# Patient Record
Sex: MALE | Race: BLACK OR AFRICAN AMERICAN | NOT HISPANIC OR LATINO | Employment: OTHER | URBAN - METROPOLITAN AREA
[De-identification: names, ages, dates, MRNs, and addresses within clinical notes are randomized per-mention and may not be internally consistent; named-entity substitution may affect disease eponyms.]

---

## 2022-10-10 RX ORDER — SODIUM CHLORIDE 450 MG/100ML
INJECTION, SOLUTION INTRAVENOUS CONTINUOUS
Status: CANCELLED | OUTPATIENT
Start: 2022-10-10

## 2022-10-10 RX ORDER — SODIUM CHLORIDE 0.9 % (FLUSH) 0.9 %
10 SYRINGE (ML) INJECTION
Status: CANCELLED | OUTPATIENT
Start: 2022-10-10

## 2022-10-11 ENCOUNTER — HOSPITAL ENCOUNTER (OUTPATIENT)
Facility: HOSPITAL | Age: 62
Discharge: HOME | End: 2022-10-11
Attending: INTERNAL MEDICINE | Admitting: INTERNAL MEDICINE
Payer: MEDICAID

## 2022-10-11 VITALS
OXYGEN SATURATION: 97 % | BODY MASS INDEX: 28.93 KG/M2 | RESPIRATION RATE: 16 BRPM | WEIGHT: 213.63 LBS | TEMPERATURE: 98 F | HEART RATE: 68 BPM | HEIGHT: 72 IN | SYSTOLIC BLOOD PRESSURE: 135 MMHG | DIASTOLIC BLOOD PRESSURE: 77 MMHG

## 2022-10-11 DIAGNOSIS — I87.1 MAY-THURNER SYNDROME: Primary | ICD-10-CM

## 2022-10-11 DIAGNOSIS — I87.2 VENOUS INSUFFICIENCY: ICD-10-CM

## 2022-10-11 LAB — POCT GLUCOSE: 101 MG/DL (ref 70–110)

## 2022-10-11 PROCEDURE — C1876 STENT, NON-COA/NON-COV W/DEL: HCPCS | Performed by: INTERNAL MEDICINE

## 2022-10-11 PROCEDURE — 36005 INJECTION EXT VENOGRAPHY: CPT | Mod: 50 | Performed by: INTERNAL MEDICINE

## 2022-10-11 PROCEDURE — 63600175 PHARM REV CODE 636 W HCPCS: Performed by: INTERNAL MEDICINE

## 2022-10-11 PROCEDURE — 37253 INTRVASC US NONCORONARY ADDL: CPT | Performed by: INTERNAL MEDICINE

## 2022-10-11 PROCEDURE — 37252 INTRVASC US NONCORONARY 1ST: CPT | Performed by: INTERNAL MEDICINE

## 2022-10-11 PROCEDURE — C1769 GUIDE WIRE: HCPCS | Performed by: INTERNAL MEDICINE

## 2022-10-11 PROCEDURE — C1725 CATH, TRANSLUMIN NON-LASER: HCPCS | Performed by: INTERNAL MEDICINE

## 2022-10-11 PROCEDURE — 75822 VEIN X-RAY ARMS/LEGS: CPT | Mod: XU | Performed by: INTERNAL MEDICINE

## 2022-10-11 PROCEDURE — 99153 MOD SED SAME PHYS/QHP EA: CPT | Performed by: INTERNAL MEDICINE

## 2022-10-11 PROCEDURE — 37238 OPEN/PERQ PLACE STENT SAME: CPT | Mod: 50 | Performed by: INTERNAL MEDICINE

## 2022-10-11 PROCEDURE — 25500020 PHARM REV CODE 255: Performed by: INTERNAL MEDICINE

## 2022-10-11 PROCEDURE — 99152 MOD SED SAME PHYS/QHP 5/>YRS: CPT | Performed by: INTERNAL MEDICINE

## 2022-10-11 PROCEDURE — C1894 INTRO/SHEATH, NON-LASER: HCPCS | Performed by: INTERNAL MEDICINE

## 2022-10-11 PROCEDURE — 25000003 PHARM REV CODE 250: Performed by: INTERNAL MEDICINE

## 2022-10-11 PROCEDURE — C1753 CATH, INTRAVAS ULTRASOUND: HCPCS | Performed by: INTERNAL MEDICINE

## 2022-10-11 DEVICE — STENT AB9U18150090 ABRE V01
Type: IMPLANTABLE DEVICE | Site: ABDOMEN | Status: FUNCTIONAL
Brand: ABRE™

## 2022-10-11 DEVICE — STENT AB9U16120090 ABRE V01
Type: IMPLANTABLE DEVICE | Site: ABDOMEN | Status: FUNCTIONAL
Brand: ABRE™

## 2022-10-11 RX ORDER — ACETAMINOPHEN 325 MG/1
650 TABLET ORAL EVERY 4 HOURS PRN
Status: DISCONTINUED | OUTPATIENT
Start: 2022-10-11 | End: 2022-10-11 | Stop reason: HOSPADM

## 2022-10-11 RX ORDER — CLOPIDOGREL BISULFATE 75 MG/1
75 TABLET ORAL DAILY
Qty: 90 TABLET | Refills: 0 | Status: ON HOLD | OUTPATIENT
Start: 2022-10-11 | End: 2023-05-25

## 2022-10-11 RX ORDER — LISINOPRIL 40 MG/1
40 TABLET ORAL EVERY MORNING
COMMUNITY
Start: 2022-09-26

## 2022-10-11 RX ORDER — LIDOCAINE HYDROCHLORIDE 10 MG/ML
INJECTION, SOLUTION EPIDURAL; INFILTRATION; INTRACAUDAL; PERINEURAL
Status: DISCONTINUED | OUTPATIENT
Start: 2022-10-11 | End: 2022-10-11 | Stop reason: HOSPADM

## 2022-10-11 RX ORDER — MAG HYDROX/ALUMINUM HYD/SIMETH 200-200-20
SUSPENSION, ORAL (FINAL DOSE FORM) ORAL
Status: DISCONTINUED | OUTPATIENT
Start: 2022-10-11 | End: 2022-10-11 | Stop reason: HOSPADM

## 2022-10-11 RX ORDER — DIAZEPAM 5 MG/1
5 TABLET ORAL
Status: DISCONTINUED | OUTPATIENT
Start: 2022-10-11 | End: 2022-10-11 | Stop reason: HOSPADM

## 2022-10-11 RX ORDER — METOPROLOL SUCCINATE 50 MG/1
50 TABLET, EXTENDED RELEASE ORAL 2 TIMES DAILY
COMMUNITY
Start: 2022-07-08

## 2022-10-11 RX ORDER — MIDAZOLAM HYDROCHLORIDE 1 MG/ML
INJECTION, SOLUTION INTRAMUSCULAR; INTRAVENOUS
Status: DISCONTINUED | OUTPATIENT
Start: 2022-10-11 | End: 2022-10-11 | Stop reason: HOSPADM

## 2022-10-11 RX ORDER — ONDANSETRON 4 MG/1
8 TABLET, ORALLY DISINTEGRATING ORAL EVERY 8 HOURS PRN
Status: DISCONTINUED | OUTPATIENT
Start: 2022-10-11 | End: 2022-10-11 | Stop reason: HOSPADM

## 2022-10-11 RX ORDER — ASPIRIN 325 MG
TABLET ORAL
Status: DISCONTINUED | OUTPATIENT
Start: 2022-10-11 | End: 2022-10-11 | Stop reason: HOSPADM

## 2022-10-11 RX ORDER — LINACLOTIDE 145 UG/1
145 CAPSULE, GELATIN COATED ORAL DAILY PRN
COMMUNITY
Start: 2022-10-03

## 2022-10-11 RX ORDER — GABAPENTIN 300 MG/1
300 CAPSULE ORAL 2 TIMES DAILY PRN
COMMUNITY
Start: 2022-05-06 | End: 2024-03-06

## 2022-10-11 RX ORDER — CLOPIDOGREL 300 MG/1
TABLET, FILM COATED ORAL
Status: DISCONTINUED | OUTPATIENT
Start: 2022-10-11 | End: 2022-10-11 | Stop reason: HOSPADM

## 2022-10-11 RX ORDER — SODIUM CHLORIDE 9 MG/ML
INJECTION, SOLUTION INTRAVENOUS CONTINUOUS
Status: DISCONTINUED | OUTPATIENT
Start: 2022-10-11 | End: 2022-10-11 | Stop reason: HOSPADM

## 2022-10-11 RX ORDER — RIVAROXABAN 20 MG/1
20 TABLET, FILM COATED ORAL EVERY MORNING
Status: ON HOLD | COMMUNITY
Start: 2022-10-03 | End: 2023-05-25 | Stop reason: SDUPTHER

## 2022-10-11 RX ORDER — DIAZEPAM 5 MG/1
10 TABLET ORAL
Status: DISPENSED | OUTPATIENT
Start: 2022-10-11

## 2022-10-11 RX ORDER — EZETIMIBE 10 MG/1
10 TABLET ORAL NIGHTLY
COMMUNITY
Start: 2022-10-04

## 2022-10-11 RX ORDER — DIPHENHYDRAMINE HCL 25 MG
50 CAPSULE ORAL
Status: DISPENSED | OUTPATIENT
Start: 2022-10-11

## 2022-10-11 RX ORDER — DICYCLOMINE HYDROCHLORIDE 20 MG/1
20 TABLET ORAL EVERY 8 HOURS PRN
COMMUNITY

## 2022-10-11 RX ORDER — FENTANYL CITRATE 50 UG/ML
INJECTION, SOLUTION INTRAMUSCULAR; INTRAVENOUS
Status: DISCONTINUED | OUTPATIENT
Start: 2022-10-11 | End: 2022-10-11 | Stop reason: HOSPADM

## 2022-10-11 RX ORDER — PANTOPRAZOLE SODIUM 40 MG/1
40 TABLET, DELAYED RELEASE ORAL EVERY MORNING
COMMUNITY
Start: 2022-06-06

## 2022-10-11 RX ORDER — AMLODIPINE BESYLATE 5 MG/1
5 TABLET ORAL NIGHTLY
COMMUNITY
Start: 2022-10-03

## 2022-10-11 RX ORDER — AMITRIPTYLINE HYDROCHLORIDE 25 MG/1
25 TABLET, FILM COATED ORAL NIGHTLY
COMMUNITY

## 2022-10-11 RX ORDER — HEPARIN SODIUM 1000 [USP'U]/ML
INJECTION, SOLUTION INTRAVENOUS; SUBCUTANEOUS
Status: DISCONTINUED | OUTPATIENT
Start: 2022-10-11 | End: 2022-10-11 | Stop reason: HOSPADM

## 2022-10-11 RX ADMIN — DIAZEPAM 5 MG: 5 TABLET ORAL at 08:10

## 2022-10-11 RX ADMIN — DIPHENHYDRAMINE HYDROCHLORIDE 50 MG: 25 CAPSULE ORAL at 08:10

## 2022-10-11 RX ADMIN — ONDANSETRON 8 MG: 4 TABLET, ORALLY DISINTEGRATING ORAL at 10:10

## 2022-10-11 NOTE — Clinical Note
The catheter was inserted into the  proximal R common iliac vein. IVUS performed then catheter removed. .

## 2022-10-11 NOTE — Clinical Note
The catheter was inserted into the  proximal L common iliac vein. IVUS performed then catheter removed. .

## 2022-10-11 NOTE — Clinical Note
A venogram was performed of the common iliac vein. The vessel was injected via power injection Bilat venogram  The venogram syringe was removed and the venogram is complete.

## 2022-10-11 NOTE — Clinical Note
dry, intact, no bleeding and no hematoma. Bilateral 10F venous sheaths left in place, covered with tegaderm.

## 2022-10-11 NOTE — Clinical Note
A percutaneous stick to the left femoral vein was performed. Ultrasound guidance was used to obtain access.

## 2022-10-11 NOTE — Clinical Note
A venogram was performed of the common iliac vein and external iliac vein. The vessel was injected via power injection Bilateral The venogram syringe was removed and the venogram is complete.

## 2022-10-27 NOTE — DISCHARGE SUMMARY
[facility] - Cath Lab Services  Discharge Note  Short Stay    Procedure(s) (LRB):  VENOGRAM, CATH LAB (N/A)  Intravascular Ultrasound, Non-Coronary      OUTCOME: Patient tolerated treatment/procedure well without complication and is now ready for discharge.    DISPOSITION: Home or Self Care    FINAL DIAGNOSIS:  <principal problem not specified>    FOLLOWUP: In clinic    DISCHARGE INSTRUCTIONS:    Discharge Procedure Orders   Diet Cardiac     Diet Cardiac     Call MD for:  temperature >100.4     Call MD for:  persistent nausea and vomiting     Call MD for:  redness, tenderness, or signs of infection (pain, swelling, redness, odor or green/yellow discharge around incision site)     Remove dressing in 24 hours     Other restrictions (specify):   Order Comments: No squatting, or heavy lifting for 1 week        TIME SPENT ON DISCHARGE: 31 minutes

## 2022-11-23 ENCOUNTER — HOSPITAL ENCOUNTER (OUTPATIENT)
Dept: RADIOLOGY | Facility: HOSPITAL | Age: 62
Discharge: HOME | End: 2022-11-23
Attending: FAMILY MEDICINE
Payer: MEDICAID

## 2022-11-23 DIAGNOSIS — M54.50 LOWER BACK PAIN: ICD-10-CM

## 2022-11-23 PROCEDURE — 72110 X-RAY EXAM L-2 SPINE 4/>VWS: CPT | Mod: TC

## 2022-11-23 PROCEDURE — 72074 X-RAY EXAM THORAC SPINE4/>VW: CPT | Mod: TC

## 2022-12-12 ENCOUNTER — HOSPITAL ENCOUNTER (OUTPATIENT)
Dept: RADIOLOGY | Facility: HOSPITAL | Age: 62
Discharge: HOME | End: 2022-12-12
Attending: FAMILY MEDICINE
Payer: MEDICAID

## 2022-12-12 DIAGNOSIS — R05.9 COUGH: ICD-10-CM

## 2022-12-12 PROCEDURE — 71046 X-RAY EXAM CHEST 2 VIEWS: CPT | Mod: TC

## 2023-03-13 ENCOUNTER — HOSPITAL ENCOUNTER (EMERGENCY)
Facility: HOSPITAL | Age: 63
Discharge: HOME | End: 2023-03-13
Attending: INTERNAL MEDICINE
Payer: MEDICAID

## 2023-03-13 VITALS
SYSTOLIC BLOOD PRESSURE: 143 MMHG | DIASTOLIC BLOOD PRESSURE: 79 MMHG | RESPIRATION RATE: 19 BRPM | BODY MASS INDEX: 30.34 KG/M2 | HEIGHT: 72 IN | HEART RATE: 61 BPM | WEIGHT: 224 LBS | OXYGEN SATURATION: 98 % | TEMPERATURE: 98 F

## 2023-03-13 DIAGNOSIS — R10.9 RIGHT FLANK PAIN: Primary | ICD-10-CM

## 2023-03-13 DIAGNOSIS — M79.669 PAIN AND SWELLING OF LOWER LEG: ICD-10-CM

## 2023-03-13 DIAGNOSIS — M79.89 PAIN AND SWELLING OF LOWER LEG: ICD-10-CM

## 2023-03-13 LAB
ALBUMIN SERPL-MCNC: 3.9 G/DL (ref 3.4–4.8)
ALBUMIN/GLOB SERPL: 1 RATIO (ref 1.1–2)
ALP SERPL-CCNC: 65 UNIT/L (ref 40–150)
ALT SERPL-CCNC: 31 UNIT/L (ref 0–55)
APPEARANCE UR: CLEAR
AST SERPL-CCNC: 25 UNIT/L (ref 5–34)
BACTERIA #/AREA URNS AUTO: NORMAL /HPF
BASOPHILS # BLD AUTO: 0.03 X10(3)/MCL (ref 0–0.2)
BASOPHILS NFR BLD AUTO: 0.8 %
BILIRUB UR QL STRIP.AUTO: NEGATIVE MG/DL
BILIRUBIN DIRECT+TOT PNL SERPL-MCNC: 0.5 MG/DL
BNP BLD-MCNC: 39.5 PG/ML
BUN SERPL-MCNC: 14 MG/DL (ref 8.4–25.7)
CALCIUM SERPL-MCNC: 9.6 MG/DL (ref 8.8–10)
CHLORIDE SERPL-SCNC: 107 MMOL/L (ref 98–107)
CO2 SERPL-SCNC: 23 MMOL/L (ref 23–31)
COLOR UR AUTO: YELLOW
CREAT SERPL-MCNC: 1.36 MG/DL (ref 0.73–1.18)
EOSINOPHIL # BLD AUTO: 0.35 X10(3)/MCL (ref 0–0.9)
EOSINOPHIL NFR BLD AUTO: 8.9 %
ERYTHROCYTE [DISTWIDTH] IN BLOOD BY AUTOMATED COUNT: 13 % (ref 11.5–17)
GFR SERPLBLD CREATININE-BSD FMLA CKD-EPI: 59 MLS/MIN/1.73/M2
GLOBULIN SER-MCNC: 4 GM/DL (ref 2.4–3.5)
GLUCOSE SERPL-MCNC: 105 MG/DL (ref 82–115)
GLUCOSE UR QL STRIP.AUTO: NEGATIVE MG/DL
HCT VFR BLD AUTO: 49.9 % (ref 42–52)
HGB BLD-MCNC: 15.5 G/DL (ref 14–18)
IMM GRANULOCYTES # BLD AUTO: 0.02 X10(3)/MCL (ref 0–0.04)
IMM GRANULOCYTES NFR BLD AUTO: 0.5 %
KETONES UR QL STRIP.AUTO: NEGATIVE MG/DL
LEUKOCYTE ESTERASE UR QL STRIP.AUTO: NEGATIVE UNIT/L
LYMPHOCYTES # BLD AUTO: 1 X10(3)/MCL (ref 0.6–4.6)
LYMPHOCYTES NFR BLD AUTO: 25.3 %
MCH RBC QN AUTO: 30.1 PG
MCHC RBC AUTO-ENTMCNC: 31.1 G/DL (ref 33–36)
MCV RBC AUTO: 96.9 FL (ref 80–94)
MONOCYTES # BLD AUTO: 0.38 X10(3)/MCL (ref 0.1–1.3)
MONOCYTES NFR BLD AUTO: 9.6 %
NEUTROPHILS # BLD AUTO: 2.17 X10(3)/MCL (ref 2.1–9.2)
NEUTROPHILS NFR BLD AUTO: 54.9 %
NITRITE UR QL STRIP.AUTO: NEGATIVE
PH UR STRIP.AUTO: 5.5 [PH]
PLATELET # BLD AUTO: 282 X10(3)/MCL (ref 130–400)
PMV BLD AUTO: 9 FL (ref 7.4–10.4)
POTASSIUM SERPL-SCNC: 5.3 MMOL/L (ref 3.5–5.1)
PROT SERPL-MCNC: 7.9 GM/DL (ref 5.8–7.6)
PROT UR QL STRIP.AUTO: ABNORMAL MG/DL
RBC # BLD AUTO: 5.15 X10(6)/MCL (ref 4.7–6.1)
RBC #/AREA URNS AUTO: NORMAL /HPF
RBC UR QL AUTO: NEGATIVE UNIT/L
SODIUM SERPL-SCNC: 138 MMOL/L (ref 136–145)
SP GR UR STRIP.AUTO: 1.01
SQUAMOUS #/AREA URNS AUTO: NORMAL /HPF
TROPONIN I SERPL-MCNC: <0.01 NG/ML (ref 0–0.04)
UROBILINOGEN UR STRIP-ACNC: 0.2 MG/DL
WBC # SPEC AUTO: 4 X10(3)/MCL (ref 4.5–11.5)
WBC #/AREA URNS AUTO: NORMAL /HPF

## 2023-03-13 PROCEDURE — 93010 EKG 12-LEAD: ICD-10-PCS | Mod: ,,, | Performed by: INTERNAL MEDICINE

## 2023-03-13 PROCEDURE — 83880 ASSAY OF NATRIURETIC PEPTIDE: CPT | Performed by: PHYSICIAN ASSISTANT

## 2023-03-13 PROCEDURE — 85025 COMPLETE CBC W/AUTO DIFF WBC: CPT | Performed by: PHYSICIAN ASSISTANT

## 2023-03-13 PROCEDURE — 81001 URINALYSIS AUTO W/SCOPE: CPT | Performed by: INTERNAL MEDICINE

## 2023-03-13 PROCEDURE — 80053 COMPREHEN METABOLIC PANEL: CPT | Performed by: PHYSICIAN ASSISTANT

## 2023-03-13 PROCEDURE — 93005 ELECTROCARDIOGRAM TRACING: CPT

## 2023-03-13 PROCEDURE — 84484 ASSAY OF TROPONIN QUANT: CPT | Performed by: PHYSICIAN ASSISTANT

## 2023-03-13 PROCEDURE — 99284 EMERGENCY DEPT VISIT MOD MDM: CPT

## 2023-03-13 PROCEDURE — 93010 ELECTROCARDIOGRAM REPORT: CPT | Mod: ,,, | Performed by: INTERNAL MEDICINE

## 2023-03-13 NOTE — ED PROVIDER NOTES
Encounter Date: 3/13/2023       History     Chief Complaint   Patient presents with    Back Pain    Leg Swelling     C/o right side back pain with swelling to both legs for 1 month     63-year-old male presents to ED for evaluation of right flank pain intermittent over the last month.  Patient reports to bilateral leg pain and swelling.  States he has a aching sensation at night.  Patient currently being treated for blood clot in his right leg.  States that he has been having issues with poor circulation in his following with Cardiology.  Denies any shortness of breath or chest pain.    The history is provided by the patient. No  was used.   Review of patient's allergies indicates:  No Known Allergies  Past Medical History:   Diagnosis Date    Anxiety disorder, unspecified     COPD (chronic obstructive pulmonary disease)     DVT (deep venous thrombosis)     Right femoral vein    Hypertension      Past Surgical History:   Procedure Laterality Date    INTRAVASCULAR ULTRASOUND, NON-CORONARY  10/11/2022    Procedure: Intravascular Ultrasound, Non-Coronary;  Surgeon: Timbo Clark MD;  Location: [facility] CATH LAB;  Service: Cardiology;;     No family history on file.  Social History     Tobacco Use    Smoking status: Former     Types: Cigarettes     Quit date:      Years since quittin.2    Smokeless tobacco: Never     Review of Systems    Physical Exam     Initial Vitals [23 1004]   BP Pulse Resp Temp SpO2   (!) 155/89 (!) 52 17 97.7 °F (36.5 °C) 98 %      MAP       --         Physical Exam    Nursing note and vitals reviewed.  Constitutional: He appears well-developed. He is cooperative.   HENT:   Head: Normocephalic and atraumatic.   Right Ear: Tympanic membrane and external ear normal.   Left Ear: Tympanic membrane and external ear normal.   Mouth/Throat: Uvula is midline, oropharynx is clear and moist and mucous membranes are normal. No trismus in the jaw. No uvula swelling.   Eyes:  Conjunctivae are normal. Pupils are equal, round, and reactive to light.   Neck: Neck supple.   Normal range of motion.  Cardiovascular:  Normal rate, regular rhythm and normal heart sounds.           Pulmonary/Chest: Breath sounds normal. No respiratory distress. He has no wheezes. He has no rhonchi. He has no rales.   Abdominal: Abdomen is soft. Bowel sounds are normal. There is no abdominal tenderness. There is no rebound and no guarding.   Musculoskeletal:         General: Normal range of motion.      Cervical back: Normal range of motion and neck supple.      Left lower le+ Edema present.      Comments: DP pulses 2+.  Swelling noted to right lower extremity greater than left     Neurological: He is alert and oriented to person, place, and time. He has normal strength. No cranial nerve deficit or sensory deficit. GCS score is 15. GCS eye subscore is 4. GCS verbal subscore is 5. GCS motor subscore is 6.   Skin: Skin is warm and dry. Capillary refill takes less than 2 seconds.   Psychiatric: He has a normal mood and affect.       ED Course   Procedures  Labs Reviewed   URINALYSIS, REFLEX TO URINE CULTURE - Abnormal; Notable for the following components:       Result Value    Protein, UA Trace (*)     All other components within normal limits   COMPREHENSIVE METABOLIC PANEL - Abnormal; Notable for the following components:    Potassium Level 5.3 (*)     Creatinine 1.36 (*)     Protein Total 7.9 (*)     Globulin 4.0 (*)     Albumin/Globulin Ratio 1.0 (*)     All other components within normal limits   CBC WITH DIFFERENTIAL - Abnormal; Notable for the following components:    WBC 4.0 (*)     MCV 96.9 (*)     MCHC 31.1 (*)     All other components within normal limits   URINALYSIS, MICROSCOPIC - Normal   B-TYPE NATRIURETIC PEPTIDE - Normal   TROPONIN I - Normal   CBC W/ AUTO DIFFERENTIAL    Narrative:     The following orders were created for panel order CBC auto differential.  Procedure                                Abnormality         Status                     ---------                               -----------         ------                     CBC with Differential[[id number]]        Abnormal            Final result                 Please view results for these tests on the individual orders.     EKG Readings: (Independently Interpreted)   Initial Reading: No STEMI. Rhythm: Sinus Bradycardia. Heart Rate: 51. Ectopy: No Ectopy. Conduction: Normal. ST Segments: Normal ST Segments. T Waves: Normal. Clinical Impression: Normal Sinus Rhythm Other Impression: Sinus bradycardia at rate of 51 otherwise normal EKG     Imaging Results    None          Medications   sodium chloride 0.9% bolus 1,000 mL 1,000 mL (1,000 mLs Intravenous Not Given 3/13/23 9765)     Medical Decision Making:   Initial Assessment:   63-year-old male presents to ED for evaluation of right flank pain intermittent over the last month.  Patient reports to bilateral leg pain and swelling.  States he has a aching sensation at night.  Patient currently being treated for blood clot in his right leg.  States that he has been having issues with poor circulation in his following with Cardiology.  Denies any shortness of breath or chest pain.  Differential Diagnosis:   Kidney stone, flank pain, DVT, electrolyte abnormality  ED Management:  Patient afebrile and in no acute distress.  Patient complaining of right flank pain with swelling and pain to his bilateral legs.  Patient reports he has been having intermittent bilateral leg pain and swelling worse on right since being diagnosed with DVT 1 month ago.  Reports that he has been seen by Cardiology with workup for poor circulation in his legs.  DP pulses 2+.  No signs of infection.  Patient denies any nausea, vomiting, or trouble urinating.  Patient concerned about kidney failure.  Mild elevation of creatinine at baseline from previous labs.  Offered patient hydration, patient refused.  Patient is requesting that he be  discharged home and will follow-up at his scheduled appointment this week with Cardiology.  Offered patient imaging for flank pain, patient refused again stating he would like to be discharged.  Return ED precautions given.  Offered patient analgesics patient declined.                        Clinical Impression:   Final diagnoses:  [M79.669, M79.89] Pain and swelling of lower leg  [R10.9] Right flank pain (Primary)        ED Disposition Condition    Discharge Stable          ED Prescriptions    None       Follow-up Information       Follow up With Specialties Details Why Contact Info    Mildred Silverio MD Family Medicine Call in 1 day  [address]  [facility]  [facility] [address]  [phone]               TIRSO Mcgraw  03/13/23 5209

## 2023-04-28 DIAGNOSIS — N23 RENAL COLIC: Primary | ICD-10-CM

## 2023-05-02 ENCOUNTER — CLINICAL SUPPORT (OUTPATIENT)
Dept: RESPIRATORY THERAPY | Facility: HOSPITAL | Age: 63
End: 2023-05-02
Attending: INTERNAL MEDICINE
Payer: MEDICAID

## 2023-05-02 DIAGNOSIS — K22.70 BARRETT'S ESOPHAGUS: Primary | ICD-10-CM

## 2023-05-02 DIAGNOSIS — K22.70 BARRETT'S ESOPHAGUS: ICD-10-CM

## 2023-05-02 PROCEDURE — 93010 EKG 12-LEAD: ICD-10-PCS | Mod: ,,, | Performed by: INTERNAL MEDICINE

## 2023-05-02 PROCEDURE — 93010 ELECTROCARDIOGRAM REPORT: CPT | Mod: ,,, | Performed by: INTERNAL MEDICINE

## 2023-05-02 PROCEDURE — 93005 ELECTROCARDIOGRAM TRACING: CPT

## 2023-05-12 ENCOUNTER — HOSPITAL ENCOUNTER (OUTPATIENT)
Dept: RADIOLOGY | Facility: HOSPITAL | Age: 63
Discharge: HOME | End: 2023-05-12
Attending: FAMILY MEDICINE
Payer: MEDICAID

## 2023-05-12 DIAGNOSIS — N23 RENAL COLIC: ICD-10-CM

## 2023-05-12 PROCEDURE — 76770 US EXAM ABDO BACK WALL COMP: CPT | Mod: TC

## 2023-05-22 DIAGNOSIS — K22.70 BARRETT'S ESOPHAGUS: Primary | ICD-10-CM

## 2023-05-24 ENCOUNTER — ANESTHESIA EVENT (OUTPATIENT)
Dept: SURGERY | Facility: HOSPITAL | Age: 63
End: 2023-05-24
Payer: MEDICAID

## 2023-05-24 ENCOUNTER — CLINICAL SUPPORT (OUTPATIENT)
Dept: RESPIRATORY THERAPY | Facility: HOSPITAL | Age: 63
End: 2023-05-24
Attending: INTERNAL MEDICINE
Payer: MEDICAID

## 2023-05-24 DIAGNOSIS — K22.70 BARRETT'S ESOPHAGUS: ICD-10-CM

## 2023-05-24 PROCEDURE — 93005 ELECTROCARDIOGRAM TRACING: CPT

## 2023-05-24 NOTE — ANESTHESIA PREPROCEDURE EVALUATION
05/24/2023  Marko Heard is a 63 y.o., male.      Pre-op Assessment    I have reviewed the Patient Summary Reports.     I have reviewed the Nursing Notes. I have reviewed the NPO Status.   I have reviewed the Medications.     Review of Systems  Anesthesia Hx:  Denies Family Hx of Anesthesia complications.   Denies Personal Hx of Anesthesia complications.   Social:  Former Smoker, Alcohol Use    Hematology/Oncology:  Hematology Normal   Oncology Normal     EENT/Dental:EENT/Dental Normal   Cardiovascular:   Hypertension, well controlled ECG has been reviewed.    Pulmonary:   COPD, moderate    Renal/:  Renal/ Normal     Hepatic/GI:   GERD, well controlled    Musculoskeletal:  Musculoskeletal Normal    Neurological:  Neurology Normal    Endocrine:  Endocrine Normal    Dermatological:  Skin Normal    Psych:   Psychiatric History          Physical Exam  General: Cooperative, Alert and Oriented    Airway:  Mallampati: II   Mouth Opening: Normal  TM Distance: Normal  Tongue: Normal  Neck ROM: Normal ROM    Dental:  Intact        Anesthesia Plan  Type of Anesthesia, risks & benefits discussed:    Anesthesia Type: Gen Natural Airway  Intra-op Monitoring Plan: Standard ASA Monitors  Post Op Pain Control Plan:   (medical reason for not using multimodal pain management)  Induction:  IV  Informed Consent: Informed consent signed with the Patient and all parties understand the risks and agree with anesthesia plan.  All questions answered. Patient consented to blood products? Yes  ASA Score: 3    Ready For Surgery From Anesthesia Perspective.     .

## 2023-05-24 NOTE — DISCHARGE INSTRUCTIONS
INSTRUCTIONS  AFTER A COLONOSCOPY/EGD                                                                                    NO DRIVING X 24 HOURS. NOTIFY YOUR DOCTOR WITH                                                                                 ABDOMINAL PAIN UNRELIEVED BY  PASSING GAS,                                                                           FEVER WITHIN 24 HOURS, ARE LARGE AMOUNT OF BLEEDING.

## 2023-05-25 ENCOUNTER — ANESTHESIA (OUTPATIENT)
Dept: SURGERY | Facility: HOSPITAL | Age: 63
End: 2023-05-25
Payer: MEDICAID

## 2023-05-25 ENCOUNTER — HOSPITAL ENCOUNTER (OUTPATIENT)
Facility: HOSPITAL | Age: 63
Discharge: HOME | End: 2023-05-25
Attending: INTERNAL MEDICINE | Admitting: INTERNAL MEDICINE
Payer: MEDICAID

## 2023-05-25 VITALS
TEMPERATURE: 98 F | HEART RATE: 60 BPM | RESPIRATION RATE: 18 BRPM | SYSTOLIC BLOOD PRESSURE: 155 MMHG | OXYGEN SATURATION: 96 % | DIASTOLIC BLOOD PRESSURE: 86 MMHG | WEIGHT: 228 LBS | BODY MASS INDEX: 30.92 KG/M2

## 2023-05-25 DIAGNOSIS — K22.70 BARRETT'S ESOPHAGUS WITHOUT DYSPLASIA: ICD-10-CM

## 2023-05-25 DIAGNOSIS — K22.70 BARRETT ESOPHAGUS: ICD-10-CM

## 2023-05-25 LAB — POCT GLUCOSE: 80 MG/DL (ref 70–110)

## 2023-05-25 PROCEDURE — 43239 EGD BIOPSY SINGLE/MULTIPLE: CPT | Performed by: INTERNAL MEDICINE

## 2023-05-25 PROCEDURE — 25000003 PHARM REV CODE 250: Performed by: NURSE ANESTHETIST, CERTIFIED REGISTERED

## 2023-05-25 PROCEDURE — D9220A PRA ANESTHESIA: Mod: ,,, | Performed by: NURSE ANESTHETIST, CERTIFIED REGISTERED

## 2023-05-25 PROCEDURE — 63600175 PHARM REV CODE 636 W HCPCS: Performed by: NURSE ANESTHETIST, CERTIFIED REGISTERED

## 2023-05-25 PROCEDURE — 27201423 OPTIME MED/SURG SUP & DEVICES STERILE SUPPLY: Performed by: INTERNAL MEDICINE

## 2023-05-25 PROCEDURE — 88313 SPECIAL STAINS GROUP 2: CPT

## 2023-05-25 PROCEDURE — 63600175 PHARM REV CODE 636 W HCPCS: Performed by: ANESTHESIOLOGY

## 2023-05-25 PROCEDURE — 88312 SPECIAL STAINS GROUP 1: CPT

## 2023-05-25 PROCEDURE — 88305 TISSUE EXAM BY PATHOLOGIST: CPT | Performed by: INTERNAL MEDICINE

## 2023-05-25 PROCEDURE — 37000009 HC ANESTHESIA EA ADD 15 MINS: Performed by: INTERNAL MEDICINE

## 2023-05-25 PROCEDURE — 37000008 HC ANESTHESIA 1ST 15 MINUTES: Performed by: INTERNAL MEDICINE

## 2023-05-25 PROCEDURE — D9220A PRA ANESTHESIA: ICD-10-PCS | Mod: ,,, | Performed by: NURSE ANESTHETIST, CERTIFIED REGISTERED

## 2023-05-25 RX ORDER — METOPROLOL TARTRATE 1 MG/ML
INJECTION, SOLUTION INTRAVENOUS
Status: DISCONTINUED | OUTPATIENT
Start: 2023-05-25 | End: 2023-05-25

## 2023-05-25 RX ORDER — LIDOCAINE HYDROCHLORIDE 20 MG/ML
SOLUTION OROPHARYNGEAL
Status: DISCONTINUED | OUTPATIENT
Start: 2023-05-25 | End: 2023-05-25

## 2023-05-25 RX ORDER — SODIUM CHLORIDE, SODIUM LACTATE, POTASSIUM CHLORIDE, CALCIUM CHLORIDE 600; 310; 30; 20 MG/100ML; MG/100ML; MG/100ML; MG/100ML
INJECTION, SOLUTION INTRAVENOUS CONTINUOUS
Status: DISCONTINUED | OUTPATIENT
Start: 2023-05-25 | End: 2023-05-25 | Stop reason: HOSPADM

## 2023-05-25 RX ORDER — CLOPIDOGREL BISULFATE 75 MG/1
75 TABLET ORAL DAILY
Qty: 90 TABLET | Refills: 0
Start: 2023-05-27 | End: 2023-08-25

## 2023-05-25 RX ORDER — LIDOCAINE HYDROCHLORIDE 20 MG/ML
INJECTION INTRAVENOUS
Status: DISCONTINUED | OUTPATIENT
Start: 2023-05-25 | End: 2023-05-25

## 2023-05-25 RX ORDER — RIVAROXABAN 20 MG/1
20 TABLET, FILM COATED ORAL EVERY MORNING
Start: 2023-05-27

## 2023-05-25 RX ORDER — PROPOFOL 10 MG/ML
VIAL (ML) INTRAVENOUS
Status: DISCONTINUED | OUTPATIENT
Start: 2023-05-25 | End: 2023-05-25

## 2023-05-25 RX ADMIN — LIDOCAINE HYDROCHLORIDE 15 ML: 20 SOLUTION ORAL at 11:05

## 2023-05-25 RX ADMIN — METOPROLOL TARTRATE 2 MG: 1 INJECTION, SOLUTION INTRAVENOUS at 11:05

## 2023-05-25 RX ADMIN — PROPOFOL 50 MG: 10 INJECTION, EMULSION INTRAVENOUS at 11:05

## 2023-05-25 RX ADMIN — LIDOCAINE HYDROCHLORIDE 100 MG: 20 INJECTION, SOLUTION INTRAVENOUS at 11:05

## 2023-05-25 RX ADMIN — SODIUM CHLORIDE, POTASSIUM CHLORIDE, SODIUM LACTATE AND CALCIUM CHLORIDE: 600; 310; 30; 20 INJECTION, SOLUTION INTRAVENOUS at 09:05

## 2023-05-25 RX ADMIN — PROPOFOL 150 MG: 10 INJECTION, EMULSION INTRAVENOUS at 11:05

## 2023-05-25 NOTE — ANESTHESIA POSTPROCEDURE EVALUATION
Anesthesia Post Evaluation    Patient: Marko Heard    Procedure(s) Performed: Procedure(s) (LRB):  EGD (ESOPHAGOGASTRODUODENOSCOPY) (N/A)  EGD, WITH CLOSED BIOPSY (N/A)    Final Anesthesia Type: general      Patient location during evaluation: OPS  Patient participation: Yes- Able to Participate  Level of consciousness: awake  Post-procedure vital signs: reviewed and stable  Pain management: adequate  Airway patency: patent  DALLAS mitigation strategies: Multimodal analgesia  PONV status at discharge: No PONV  Anesthetic complications: no      Cardiovascular status: hemodynamically stable  Respiratory status: unassisted, room air and spontaneous ventilation  Hydration status: euvolemic  Follow-up not needed.          Vitals Value Taken Time   /90 05/25/23 0928   Temp 36.6 °C (97.9 °F) 05/25/23 0928   Pulse 60 05/25/23 0928   Resp 18 05/25/23 1150   SpO2 97 % 05/25/23 0928         No case tracking events are documented in the log.      Pain/Yoli Score: No data recorded

## 2023-05-25 NOTE — OP NOTE
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  The patient was given the opportunity to ask questions and then signed informed consent. Patient was positioned in the left lateral decubitus position, continuous monitoring was initiated, and supplemental oxygen was provided via nasal cannula.  Bite block was placed.  Adequate sedation was achieved with the above mentioned medications and then titrated during the entire procedure.  Under direct visualization the gastroscope was introduced through the oropharynx in to the esophagus.  The scope was then advanced in to the stomach and second portion of the duodenum.  Scope was then withdrawn and the mucosa was carefully examined.  The entire gastric mucosa was examined, including the fundus with retroflexion.  Air was evacuated from the stomach and the scope was withdrawn in to the esophagus.  The entire esophageal mucosa was examined.  The patient tolerated the procedure well and was able to be transferred to the recovery area in stable condition.    Findings:                 Esophagus:  Normal esophagus.  Good peristalsis.  No signs of recurrence of Cross's  esophagus.  No biopsies taken.  Z-line was normal                    Stomach:  The stomach showed generalized mild to moderate gastritis with some petechial lose around the antral area.  Jar A was sent off for H pylori analysis.  Jar B was sent off for pathology evaluation plan on also jar C for angularis which had some of these lesions as well.  (I have a suspicion H pylori may be playing a factor) to the patient taking NSAIDs? ).                 Duodenum:  Duodenum was free of any mass lesions or polyps.     Specimens:   Specimen (24h ago, onward)       Start     Ordered    05/25/23 1143  Specimen to Pathology  RELEASE UPON ORDERING        References:    Click here for ordering Quick Tip   Question:  Release to patient  Answer:  Immediate    05/25/23 1143                      Estimated Blood Loss (EBL): * No values recorded between 5/25/2023 11:31 AM and 5/25/2023 11:53 AM *     Complications: No     Diagnostic Impression:  Patient with antral and lower gastric gastritis with some unusual atypical features which we need to see if through regarding H pylori infection versus some other issues.  Consider eosinophilic gastritis as well.  Could be taking NSAIDs indiscriminately as well?       Recommendations: Discharge patient to home. Resume previous diet.  Continue PPI.  Follow my Dr. CAMP through G recommendations for reflux..    Disposition:  Surgical floor stable then okay to go home.     Attestation: I performed the procedure.        Follow Up:             Will follow-up in 2 weeks to discuss pathology report        Michele Willson III, MD  5/25/2023

## 2023-05-26 LAB — PSYCHE PATHOLOGY RESULT: NORMAL

## 2023-06-27 ENCOUNTER — HOSPITAL ENCOUNTER (EMERGENCY)
Facility: HOSPITAL | Age: 63
Discharge: HOME | End: 2023-06-27
Attending: EMERGENCY MEDICINE
Payer: MEDICAID

## 2023-06-27 VITALS
TEMPERATURE: 98 F | RESPIRATION RATE: 18 BRPM | BODY MASS INDEX: 30.88 KG/M2 | SYSTOLIC BLOOD PRESSURE: 127 MMHG | WEIGHT: 228 LBS | HEART RATE: 68 BPM | DIASTOLIC BLOOD PRESSURE: 74 MMHG | HEIGHT: 72 IN | OXYGEN SATURATION: 98 %

## 2023-06-27 DIAGNOSIS — R07.9 CHEST PAIN: ICD-10-CM

## 2023-06-27 DIAGNOSIS — R07.89 ATYPICAL CHEST PAIN: Primary | ICD-10-CM

## 2023-06-27 LAB
ALBUMIN SERPL-MCNC: 3.9 G/DL (ref 3.4–4.8)
ALBUMIN/GLOB SERPL: 1.1 RATIO (ref 1.1–2)
ALP SERPL-CCNC: 75 UNIT/L (ref 40–150)
ALT SERPL-CCNC: 36 UNIT/L (ref 0–55)
AST SERPL-CCNC: 26 UNIT/L (ref 5–34)
BASOPHILS # BLD AUTO: 0.05 X10(3)/MCL
BASOPHILS NFR BLD AUTO: 1.1 %
BILIRUBIN DIRECT+TOT PNL SERPL-MCNC: 0.7 MG/DL
BUN SERPL-MCNC: 18 MG/DL (ref 8.4–25.7)
CALCIUM SERPL-MCNC: 9.7 MG/DL (ref 8.8–10)
CHLORIDE SERPL-SCNC: 109 MMOL/L (ref 98–107)
CO2 SERPL-SCNC: 22 MMOL/L (ref 23–31)
CREAT SERPL-MCNC: 1.47 MG/DL (ref 0.73–1.18)
EOSINOPHIL # BLD AUTO: 0.49 X10(3)/MCL (ref 0–0.9)
EOSINOPHIL NFR BLD AUTO: 11.1 %
ERYTHROCYTE [DISTWIDTH] IN BLOOD BY AUTOMATED COUNT: 12.7 % (ref 11.5–17)
GFR SERPLBLD CREATININE-BSD FMLA CKD-EPI: 54 MLS/MIN/1.73/M2
GLOBULIN SER-MCNC: 3.4 GM/DL (ref 2.4–3.5)
GLUCOSE SERPL-MCNC: 113 MG/DL (ref 82–115)
HCT VFR BLD AUTO: 47.3 % (ref 42–52)
HGB BLD-MCNC: 15.8 G/DL (ref 14–18)
IMM GRANULOCYTES # BLD AUTO: 0.01 X10(3)/MCL (ref 0–0.04)
IMM GRANULOCYTES NFR BLD AUTO: 0.2 %
LIPASE SERPL-CCNC: 15 U/L
LYMPHOCYTES # BLD AUTO: 1.08 X10(3)/MCL (ref 0.6–4.6)
LYMPHOCYTES NFR BLD AUTO: 24.5 %
MCH RBC QN AUTO: 30.7 PG (ref 27–31)
MCHC RBC AUTO-ENTMCNC: 33.4 G/DL (ref 33–36)
MCV RBC AUTO: 92 FL (ref 80–94)
MONOCYTES # BLD AUTO: 0.41 X10(3)/MCL (ref 0.1–1.3)
MONOCYTES NFR BLD AUTO: 9.3 %
NEUTROPHILS # BLD AUTO: 2.36 X10(3)/MCL (ref 2.1–9.2)
NEUTROPHILS NFR BLD AUTO: 53.8 %
NRBC BLD AUTO-RTO: 0 %
PLATELET # BLD AUTO: 262 X10(3)/MCL (ref 130–400)
PMV BLD AUTO: 9.2 FL (ref 7.4–10.4)
POTASSIUM SERPL-SCNC: 4.5 MMOL/L (ref 3.5–5.1)
PROT SERPL-MCNC: 7.3 GM/DL (ref 5.8–7.6)
RBC # BLD AUTO: 5.14 X10(6)/MCL (ref 4.7–6.1)
SODIUM SERPL-SCNC: 140 MMOL/L (ref 136–145)
TROPONIN I SERPL-MCNC: <0.01 NG/ML (ref 0–0.04)
WBC # SPEC AUTO: 4.4 X10(3)/MCL (ref 4.5–11.5)

## 2023-06-27 PROCEDURE — 25000003 PHARM REV CODE 250: Performed by: EMERGENCY MEDICINE

## 2023-06-27 PROCEDURE — 85025 COMPLETE CBC W/AUTO DIFF WBC: CPT | Performed by: EMERGENCY MEDICINE

## 2023-06-27 PROCEDURE — 83690 ASSAY OF LIPASE: CPT | Performed by: EMERGENCY MEDICINE

## 2023-06-27 PROCEDURE — 84484 ASSAY OF TROPONIN QUANT: CPT | Performed by: EMERGENCY MEDICINE

## 2023-06-27 PROCEDURE — 80053 COMPREHEN METABOLIC PANEL: CPT | Performed by: EMERGENCY MEDICINE

## 2023-06-27 PROCEDURE — 99285 EMERGENCY DEPT VISIT HI MDM: CPT | Mod: 25

## 2023-06-27 RX ORDER — ALUMINUM HYDROXIDE, MAGNESIUM HYDROXIDE, AND SIMETHICONE 2400; 240; 2400 MG/30ML; MG/30ML; MG/30ML
30 SUSPENSION ORAL
Status: COMPLETED | OUTPATIENT
Start: 2023-06-27 | End: 2023-06-27

## 2023-06-27 RX ORDER — TRAMADOL HYDROCHLORIDE 50 MG/1
50 TABLET ORAL EVERY 6 HOURS PRN
Qty: 20 TABLET | Refills: 0 | Status: SHIPPED | OUTPATIENT
Start: 2023-06-27 | End: 2023-07-02

## 2023-06-27 RX ORDER — SUCRALFATE 1 G/1
1 TABLET ORAL
Status: COMPLETED | OUTPATIENT
Start: 2023-06-27 | End: 2023-06-27

## 2023-06-27 RX ADMIN — ALUMINUM HYDROXIDE, MAGNESIUM HYDROXIDE, AND DIMETHICONE 30 ML: 400; 400; 40 SUSPENSION ORAL at 09:06

## 2023-06-27 RX ADMIN — SUCRALFATE 1 G: 1 TABLET ORAL at 09:06

## 2023-06-27 NOTE — ED PROVIDER NOTES
"Encounter Date: 6/27/2023       History     Chief Complaint   Patient presents with    Chest Pain     Pt complaint of intermittent chest pain over the past week with occurnace of general bad "burning" for a couple of days     The history is provided by the patient.   Chest Pain  The current episode started several days ago. Duration of episode(s) is 1 week. Chest pain occurs intermittently. The chest pain is unchanged. The quality of the pain is described as burning. The pain radiates to the epigastrium. Primary symptoms include abdominal pain. Pertinent negatives for primary symptoms include no fever, no shortness of breath, no cough, no palpitations, no nausea and no vomiting.   The abdominal pain began more than 2 days ago. The abdominal pain is located in the epigastric region.   Pertinent negatives for associated symptoms include no weakness. He tried nothing for the symptoms. Risk factors include male gender.   His past medical history is significant for DVT, hypertension and PVD.   Review of patient's allergies indicates:  No Known Allergies  Past Medical History:   Diagnosis Date    Anxiety disorder, unspecified     Cross esophagus     COPD (chronic obstructive pulmonary disease)     DVT (deep venous thrombosis)     Right femoral vein    Frequent urination     GERD (gastroesophageal reflux disease)     High cholesterol     Hypertension     PVD (peripheral vascular disease)     Seasonal allergies      Past Surgical History:   Procedure Laterality Date    EGD, WITH CLOSED BIOPSY N/A 5/25/2023    Procedure: EGD, WITH CLOSED BIOPSY;  Surgeon: Michele Willson III, MD;  Location: [facility];  Service: Endoscopy;  Laterality: N/A;  A.) Bx of antrum for h-pylori  B.) Bx of antrum pathology analysis  C.) Bx of angularis    ESOPHAGOGASTRODUODENOSCOPY N/A 5/25/2023    Procedure: EGD (ESOPHAGOGASTRODUODENOSCOPY);  Surgeon: Michele Willson III, MD;  Location: [facility];  Service: Endoscopy;  Laterality: N/A;  " Moderate Gastritis    INTRAVASCULAR ULTRASOUND, NON-CORONARY  10/11/2022    Procedure: Intravascular Ultrasound, Non-Coronary;  Surgeon: Timbo Clark MD;  Location: [facility] CATH LAB;  Service: Cardiology;;     Family History   Problem Relation Age of Onset    Hypertension Mother     Diabetes Mother     Diabetes Father     Hypertension Father      Social History     Tobacco Use    Smoking status: Former     Types: Cigarettes     Quit date:      Years since quittin.4    Smokeless tobacco: Never   Substance Use Topics    Alcohol use: Yes     Comment: Occasionally     Review of Systems   Constitutional:  Negative for fever.   HENT:  Negative for sore throat.    Respiratory:  Negative for cough and shortness of breath.    Cardiovascular:  Positive for chest pain. Negative for palpitations.   Gastrointestinal:  Positive for abdominal pain. Negative for nausea and vomiting.   Genitourinary:  Negative for dysuria.   Musculoskeletal:  Negative for back pain.   Skin:  Negative for rash.   Neurological:  Negative for weakness.   Hematological:  Does not bruise/bleed easily.     Physical Exam     Initial Vitals [23 0843]   BP Pulse Resp Temp SpO2   (!) 148/96 67 18 98.2 °F (36.8 °C) 98 %      MAP       --         Physical Exam    Nursing note and vitals reviewed.  Constitutional: He appears well-developed and well-nourished.   HENT:   Head: Normocephalic and atraumatic.   Right Ear: External ear normal.   Left Ear: External ear normal.   Nose: Nose normal.   Eyes: Conjunctivae and EOM are normal. Pupils are equal, round, and reactive to light.   Neck: Neck supple.   Normal range of motion.  Cardiovascular:  Normal rate, regular rhythm, normal heart sounds and intact distal pulses.           Pulmonary/Chest: Breath sounds normal.   Abdominal: Abdomen is soft. Bowel sounds are normal. There is abdominal tenderness in the epigastric area. There is no rebound and no guarding.   Musculoskeletal:         General: Normal  range of motion.      Cervical back: Normal range of motion and neck supple.     Neurological: He is alert and oriented to person, place, and time. He has normal strength. GCS score is 15. GCS eye subscore is 4. GCS verbal subscore is 5. GCS motor subscore is 6.   Skin: Skin is warm and dry. Capillary refill takes less than 2 seconds.   Psychiatric: He has a normal mood and affect. His behavior is normal. Judgment and thought content normal.       ED Course   Procedures  Labs Reviewed   COMPREHENSIVE METABOLIC PANEL - Abnormal; Notable for the following components:       Result Value    Chloride 109 (*)     Carbon Dioxide 22 (*)     Creatinine 1.47 (*)     All other components within normal limits   CBC WITH DIFFERENTIAL - Abnormal; Notable for the following components:    WBC 4.40 (*)     All other components within normal limits   LIPASE - Normal   TROPONIN I - Normal   CBC W/ AUTO DIFFERENTIAL    Narrative:     The following orders were created for panel order CBC auto differential.  Procedure                               Abnormality         Status                     ---------                               -----------         ------                     CBC with Differential[[id number]]        Abnormal            Final result                 Please view results for these tests on the individual orders.   RAINBOW DRAW    Narrative:     The following orders were created for panel order [place] Draw.  Procedure                               Abnormality         Status                     ---------                               -----------         ------                     Light Blue Top Hold[[id number]]                              In process                 Light Green Top Hold[[id number]]                             In process                 Lavender Top Hold[[id number]]                                In process                   Please view results for these tests on the individual orders.   LIGHT BLUE TOP HOLD    LIGHT GREEN TOP HOLD   LAVENDER TOP HOLD   RAINBOW DRAW    Narrative:     The following orders were created for panel order [place] Draw.  Procedure                               Abnormality         Status                     ---------                               -----------         ------                     Gold Top Hold[[id number]]                                    In process                   Please view results for these tests on the individual orders.   GOLD TOP HOLD     EKG Readings: (Independently Interpreted)   Initial Reading: No STEMI. Rhythm: Normal Sinus Rhythm. Heart Rate: 66. Ectopy: No Ectopy. Conduction: Normal. ST Segments: Normal ST Segments. T Waves: Normal. Axis: Normal. Clinical Impression: Normal Sinus Rhythm     Imaging Results              X-Ray Chest AP Portable (Final result)  Result time 06/27/23 09:06:48      Final result by Tre Patel MD (06/27/23 09:06:48)                   Impression:      1. No evidence of lobar type consolidation or acute cardiac decompensation is appreciated.      Electronically signed by: Tre Patel MD  Date:    06/27/2023  Time:    09:06               Narrative:    EXAMINATION:  XR CHEST AP PORTABLE    CLINICAL HISTORY:  Chest pain.    COMPARISON:  Chest x-ray 12/12/2022    FINDINGS:  Frontal view of the chest was obtained.  The cardiac silhouette is within normal limits for size. The aorta is mildly tortuous.  Pulmonary hyperinflation is seen.  Minimal reticular opacities in the left lung base may reflect atelectasis or scarring.  No evidence of lobar type consolidation, visible pneumothorax, or pleural effusion is seen.  No acute displaced fracture or dislocation is present.                                       Medications   aluminum & magnesium hydroxide-simethicone 400-400-40 mg/5 mL suspension 30 mL (30 mLs Oral Given 6/27/23 0916)   sucralfate tablet 1 g (1 g Oral Given 6/27/23 0916)      Differential includes:  angina, GERD, MSK pain, pleurisy,  gastritis, pancreatitis, PUD.  Will obtain EKG, CXR, CBC, CMP, lipase, troponin.               Minimal relief with Mylanta/Carafate.  Reviewed labs, EKG, CXR findings with patient and reassured that this does not appear to be cardiac in nature.  He has an Rx for PPI but does not take regularly.  Encouraged to take daily for at least the next week, or so to see if his symptoms improve.  Will D/C with analgesic and encouraged him to call Dr. Gallagher's office to schedule early F/U visit.         Clinical Impression:   Final diagnoses:  [R07.9] Chest pain  [R07.89] Atypical chest pain (Primary)        ED Disposition Condition    Discharge Stable          ED Prescriptions       Medication Sig Dispense Start Date End Date Auth. Provider    traMADoL (ULTRAM) 50 mg tablet Take 1 tablet (50 mg total) by mouth every 6 (six) hours as needed for Pain. 20 tablet 6/27/2023 7/2/2023 Ashu Lopez MD          Follow-up Information       Follow up With Specialties Details Why Contact Info    Ayush Paulino MD Cardiology Schedule an appointment as soon as possible for a visit in 1 week  [address] [place] [address]  [address]  [facility] [address]  [phone]               Ashu Lopez MD  06/27/23 3460

## 2023-06-27 NOTE — ED TRIAGE NOTES
"Pt complaint of intermittent chest pain over the past week with occurnace of general bad "burning" for a couple of days  "

## 2023-09-06 ENCOUNTER — HOSPITAL ENCOUNTER (EMERGENCY)
Facility: HOSPITAL | Age: 63
Discharge: HOME | End: 2023-09-06
Attending: INTERNAL MEDICINE
Payer: MEDICAID

## 2023-09-06 VITALS
BODY MASS INDEX: 30.07 KG/M2 | RESPIRATION RATE: 15 BRPM | SYSTOLIC BLOOD PRESSURE: 163 MMHG | TEMPERATURE: 96 F | OXYGEN SATURATION: 98 % | HEIGHT: 72 IN | DIASTOLIC BLOOD PRESSURE: 93 MMHG | WEIGHT: 222 LBS | HEART RATE: 60 BPM

## 2023-09-06 DIAGNOSIS — N40.0 PROSTATISM: ICD-10-CM

## 2023-09-06 DIAGNOSIS — M54.6 CHRONIC RIGHT-SIDED THORACIC BACK PAIN: ICD-10-CM

## 2023-09-06 DIAGNOSIS — M54.14 RADICULAR PAIN OF THORACIC REGION: Primary | ICD-10-CM

## 2023-09-06 DIAGNOSIS — G89.29 CHRONIC RIGHT-SIDED THORACIC BACK PAIN: ICD-10-CM

## 2023-09-06 LAB
APPEARANCE UR: CLEAR
BILIRUB UR QL STRIP.AUTO: NEGATIVE
COLOR UR: YELLOW
GLUCOSE UR QL STRIP.AUTO: NEGATIVE
KETONES UR QL STRIP.AUTO: NEGATIVE
LEUKOCYTE ESTERASE UR QL STRIP.AUTO: NEGATIVE
NITRITE UR QL STRIP.AUTO: NEGATIVE
PH UR STRIP.AUTO: 5.5 [PH]
PROT UR QL STRIP.AUTO: NEGATIVE
RBC UR QL AUTO: NEGATIVE
SP GR UR STRIP.AUTO: 1.01 (ref 1–1.03)
UROBILINOGEN UR STRIP-ACNC: 0.2

## 2023-09-06 PROCEDURE — 99282 EMERGENCY DEPT VISIT SF MDM: CPT

## 2023-09-06 PROCEDURE — 81003 URINALYSIS AUTO W/O SCOPE: CPT | Performed by: INTERNAL MEDICINE

## 2023-09-06 NOTE — ED PROVIDER NOTES
09/06/2023         9:14 AM    Source of History:  History obtained from the patient.     Chief complaint:  From Nurse Triage:  Urinary Frequency (C/o burning sensation to R side of abdomen and frequent urination for the past few months. States his urine has had a smell for the past few weeks.)    HISTORY OF PRESENT ILLNES:  Marko Heard is a 63 y.o. male  has a past medical history of Anxiety disorder, unspecified, Cross esophagus, COPD (chronic obstructive pulmonary disease), DVT (deep venous thrombosis), Frequent urination, GERD (gastroesophageal reflux disease), High cholesterol, Hypertension, PVD (peripheral vascular disease), and Seasonal allergies. presenting with Urinary Frequency (C/o burning sensation to R side of abdomen and frequent urination for the past few months. States his urine has had a smell for the past few weeks.)      REVIEW OF SYSTEMS:   Constitutional symptoms:  No Fever. No Chills    Skin symptoms:  No Rash.    Eye symptoms:  No Visual disturbance reported.   ENMT symptoms:  No Sore throat,    Respiratory symptoms:  No Shortness of Breath, no Cough, no Wheezing.    Cardiovascular symptoms:  No Chest Pain, No Palpitations.   Gastrointestinal symptoms:  No Abdominal Pain, No Nausea, No Vomiting, No Diarrhea, No Constipation.  Right flank burning sensation for months  Genitourinary symptoms:  No Dysuria,  nocturia multiple times  Musculoskeletal symptoms:  No Back pain,    Neurologic symptoms:  No Headache, No Dizziness.    Psychiatric symptoms:  No Anxiety, No Depression, No Substance Abuse.              Additional review of systems information: Patient Denies Any Other Complaints.    All Other Systems Reviewed With Patient And Negative.    ALLEGIES:  Review of patient's allergies indicates:  No Known Allergies    MEDICINE LIST:  Current Outpatient Medications   Medication Instructions    amitriptyline (ELAVIL) 25 mg, Oral, Nightly    amLODIPine (NORVASC) 5 mg, Oral, Nightly    clopidogreL  (PLAVIX) 75 mg, Oral, Daily    dicyclomine (BENTYL) 20 mg, Oral, Every 8 hours PRN    ezetimibe (ZETIA) 10 mg, Oral, Nightly    gabapentin (NEURONTIN) 300 mg, Oral, 2 times daily PRN    LINZESS 145 mcg, Oral, Daily PRN    lisinopriL (PRINIVIL,ZESTRIL) 40 mg, Oral, Every morning    metoprolol succinate (TOPROL-XL) 50 mg, Oral, 2 times daily    pantoprazole (PROTONIX) 40 mg, Oral, Every morning    XARELTO 20 mg, Oral, Every morning, Stopped 23        PMH:  As per HPI and below:    Reviewed and updated in chart.    PAST MEDICAL HISTORY:  Past Medical History:   Diagnosis Date    Anxiety disorder, unspecified     Cross esophagus     COPD (chronic obstructive pulmonary disease)     DVT (deep venous thrombosis)     Right femoral vein    Frequent urination     GERD (gastroesophageal reflux disease)     High cholesterol     Hypertension     PVD (peripheral vascular disease)     Seasonal allergies         PAST SURGICAL HISTORY:  Past Surgical History:   Procedure Laterality Date    EGD, WITH CLOSED BIOPSY N/A 2023    Procedure: EGD, WITH CLOSED BIOPSY;  Surgeon: Michele Willson III, MD;  Location: [facility];  Service: Endoscopy;  Laterality: N/A;  A.) Bx of antrum for h-pylori  B.) Bx of antrum pathology analysis  C.) Bx of angularis    ESOPHAGOGASTRODUODENOSCOPY N/A 2023    Procedure: EGD (ESOPHAGOGASTRODUODENOSCOPY);  Surgeon: Michele Willson III, MD;  Location: [facility];  Service: Endoscopy;  Laterality: N/A;  Moderate Gastritis    INTRAVASCULAR ULTRASOUND, NON-CORONARY  10/11/2022    Procedure: Intravascular Ultrasound, Non-Coronary;  Surgeon: Timbo Clark MD;  Location: [facility] CATH LAB;  Service: Cardiology;;       SOCIAL HISTORY:  Social History     Tobacco Use    Smoking status: Former     Current packs/day: 0.00     Types: Cigarettes     Quit date:      Years since quittin.6    Smokeless tobacco: Never   Substance Use Topics    Alcohol use: Yes     Comment: Occasionally       FAMILY  HISTORY:  Family History   Problem Relation Age of Onset    Hypertension Mother     Diabetes Mother     Diabetes Father     Hypertension Father         PROBLEM LIST:  Patient Active Problem List   Diagnosis    Venous insufficiency    May-Thurner syndrome        PHYSICAL EXAM:      ED Triage Vitals [09/06/23 0840]   BP (!) 163/93   Pulse 60   Resp 15   Temp 96.4 °F (35.8 °C)   SpO2 98 %        Vital Signs: Reviewed As In Chart.  General:  Alert, No Cardiorespiratory Distress Noted.   Eye:  Extraocular Movements Are Intact.   ENT: Mucus membranes are moist.   Cardiovascular:  Regular Rate And Rhythm, No Murmur, No Pedal Edema.  1+ bilateral posterior tibial   Respiratory:  Respirations Nonlabored, No Respiratory Distress, Good Bilateral Air Entry, No Rales, No Rhonchi.    Gastrointestinal:  Soft, Non Distended, Non Tenderness, Normal Bowel Sounds.  No rash in the area where he has burning sensation for months now  Neurological:  Alert And Oriented To Person, Place, Time, And Situation, Normal Motor Observed, Normal Speech Observed.  Musculoskeletal:  No Gross Deformity Noted.     Psychiatric:  Cooperative.      ED WORKUP FOR MEDICAL DECISION MAKING:    ED ORDERS:  Orders Placed This Encounter   Procedures    Urinalysis, Reflex to Urine Culture       ED MEDICINES:  Medications - No data to display             ED LABS ORDERED AND REVIEWED:  Admission on 09/06/2023   Component Date Value Ref Range Status    Color, UA 09/06/2023 Yellow  Yellow, Light-Yellow, Dark Yellow, Carol, Straw Final    Appearance, UA 09/06/2023 Clear  Clear Final    Specific Gravity, UA 09/06/2023 1.010  1.005 - 1.030 Final    pH, UA 09/06/2023 5.5  5.0 - 8.5 Final    Protein, UA 09/06/2023 Negative  Negative Final    Glucose, UA 09/06/2023 Negative  Negative, Normal Final    Ketones, UA 09/06/2023 Negative  Negative Final    Blood, UA 09/06/2023 Negative  Negative Final    Bilirubin, UA 09/06/2023 Negative  Negative Final    Urobilinogen, UA  09/06/2023 0.2  0.2, 1.0, Normal Final    Nitrites, UA 09/06/2023 Negative  Negative Final    Leukocyte Esterase, UA 09/06/2023 Negative  Negative Final       RADIOLOGY STUDIES ORDERED AND REVIEWED:  Imaging Results    None         MEDICAL DECISION MAKING:      Reviewed Nurses Note. Reviewed Vital Signs.     Reviewed Pertinent old records, History and updated as necessary.    Vitals:    09/06/23 0840   BP: (!) 163/93   Pulse: 60   Resp: 15   Temp: 96.4 °F (35.8 °C)        Medical Decision Making  63 y.o. male  has a past medical history of Anxiety disorder, unspecified, Cross esophagus, COPD (chronic obstructive pulmonary disease), DVT (deep venous thrombosis), Frequent urination, GERD (gastroesophageal reflux disease), High cholesterol, Hypertension, PVD (peripheral vascular disease), and Seasonal allergies. presenting with Urinary Frequency (C/o burning sensation to R side of abdomen and frequent urination for the past few months. States his urine has had a smell for the past few weeks.)    Patient comes to the emergency room with complaint of right flank area burning sensation for the last month or so, and then he has been having to get up at night 4-5 times to urinate, he also feels that there is some smell in the urine and he feels that something wrong with his kidney.  He also has chronic back pain and he has peripheral vascular disease he says the pain in his back is going down the legs now, and he is concerned that the something going on with his blood supply in the legs.    Patient has history of peripheral vascular disease, he is has stents in both the legs, he had DVT in the right leg, he is on Xarelto and Plavix for that    Patient also is taking gabapentin and Elavil for his chronic back pain    He has Cross's esophagus and he is taking medicine for that.    He is also getting up 4-5 times at night, and he feels that is something wrong with his kidney.    I have explained to patient that all his  problems are chronic and essentially needs to be addressed by different specialists, and they can always talk to his family doctor about getting referrals to them.  Patient says he already has seen his family doctor for this and she feels maybe a pinched nerve.  I advised him that then he already knows what is going on with his back.  So I would recommend to continue taking the same medicines and I can not prescribe him some muscle relaxant but he does not want to take them.    I have advised him to talk to his the vascular Dr. to evaluate him for blood supply to the legs but he has pulses in both the legs and he does not have any acute changes in there for which he needs to be transferred today and he can always talk to his doctor.  Patient verbalized understanding    After talking to me about 30 minutes patient is satisfied and is going home to see his family doctor, Urology, Cardiology and his back doctor.    Problems Addressed:  Chronic right-sided thoracic back pain:     Details: Patient is already being treated for this already knows that he has disc problems in the back  Prostatism:     Details: Patient has a perfectly normal urine, and I have explained every single line in the urine to the patient and I have advised him that this appears to be a problem with prostatism and needs to go and see a urologist to check his prostate or his family doctor can check for that.  But as such he does not have urinary obstruction at this time so there is no need to do any intervention at this time but he will definitely benefit from urology evaluation.  Radicular pain of thoracic region:     Details: Burning pain in the right lower thoracic and upper flank has been there for months and months now and he is it is a problem with the blood supply and I have explained to him that the blood supply to the leg will not be affected by this, he does not know if he ever had shingles in that area, he does not have any rash at this  time, I have explained to him that this is more radicular pain and is already taking gabapentin and Elavil for this, I can not prescribe him some muscle relaxant to see if that helps, but he says he does not want to take anymore medicines because there is kidney disease in the family and he does not want to get his kidneys involved.  He has applied for disability though on the basis of all these problems which he is reporting to me.  He just wanted to get checked out in the emergency room.    Amount and/or Complexity of Data Reviewed  Labs: ordered. Decision-making details documented in ED Course.                        PROCEDURES PERFORMED IN ED:  Procedures    DIAGNOSTIC IMPRESSION:        ICD-10-CM ICD-9-CM   1. Radicular pain of thoracic region  M54.14 724.4   2. Prostatism  N40.0 600.90   3. Chronic right-sided thoracic back pain  M54.6 724.1    G89.29 338.29         ED Disposition Condition    Discharge Stable               Medication List        CONTINUE taking these medications      amitriptyline 25 MG tablet  Commonly known as: ELAVIL     amLODIPine 5 MG tablet  Commonly known as: NORVASC     clopidogreL 75 mg tablet  Commonly known as: PLAVIX  Take 1 tablet (75 mg total) by mouth once daily. for 90 doses     dicyclomine 20 mg tablet  Commonly known as: BENTYL     ezetimibe 10 mg tablet  Commonly known as: ZETIA     gabapentin 300 MG capsule  Commonly known as: NEURONTIN     LINZESS 145 mcg Cap capsule  Generic drug: linaCLOtide     lisinopriL 40 MG tablet  Commonly known as: PRINIVIL,ZESTRIL     metoprolol succinate 50 MG 24 hr tablet  Commonly known as: TOPROL-XL     pantoprazole 40 MG tablet  Commonly known as: PROTONIX     XARELTO 20 mg Tab  Generic drug: rivaroxaban  Take 1 tablet (20 mg total) by mouth every morning. Stopped 5/20/23                Follow-up Information       Mildred Silverio MD In 2 days.    Specialty: Family Medicine  Contact information:  [address]  [place]  [address]  [phone]               Call  Urology.                              ED Prescriptions    None       Follow-up Information       Follow up With Specialties Details Why Contact Info    Mildred Silverio MD Family Medicine In 2 days  [address]  [address]  [facility] [address] [phone]      Urology  Call                  Nicole Rothman MD  09/06/23 9326

## 2023-09-06 NOTE — DISCHARGE INSTRUCTIONS
Talk to your vascular doctor to re-evaluate you for peripheral vascular disease    Talk to your family doctor to either refer you to a urologist or you can go and see the urologist yourself for your repeated urination at nighttime needs to be checked for prostate        Take medicines as prescribed, see a urologist for follow-up, further workup, and treatment as needed.    Return to emergency room in case you develop fever, vomiting, with pain together    See a family doctor to refer to a urologist    Luigi Chavez MD  [address] [facility] [place]  [address]      [address] MD.  [address] [facility] [place]  [address]          Take medicines as prescribed    See your family doctor in one to 2 days for further evaluation, workup, and treatment as necessary    Avoid driving or operating machinery while taking medicines as some medicines might cause drowsiness and may cause problems. Also pain medicines have potential of being addictive  so use Pain meds specially Narcotics Sparingly.    The exam and treatment you received in Emergency Room was for an urgent problem and NOT INTENDED AS COMPLETE CARE. It is important that you FOLLOW UP with a doctor for ongoing care. If your symptoms become WORSE or you DO NOT IMPROVE and you are unable to reach your health care provider, you should RETURN to the emergency department. The Emergency Room doctor has provided a PRELIMINARY INTERPRETATION of all your STUDIES. A final interpretation may be done after you are discharged. IF A CHANGE in your diagnosis or treatment is needed WE WILL CONTACT YOU. It is critical that we have a CURRENT PHONE NUMBER FOR YOU.

## 2023-12-06 ENCOUNTER — LAB VISIT (OUTPATIENT)
Dept: LAB | Facility: HOSPITAL | Age: 63
End: 2023-12-06
Attending: FAMILY MEDICINE
Payer: MEDICAID

## 2023-12-06 DIAGNOSIS — I11.9 HYPERTENSIVE HEART DISEASE WITHOUT CONGESTIVE HEART FAILURE: Primary | ICD-10-CM

## 2023-12-06 DIAGNOSIS — E78.5 HYPERLIPIDEMIA, UNSPECIFIED HYPERLIPIDEMIA TYPE: ICD-10-CM

## 2023-12-06 DIAGNOSIS — R73.01 IMPAIRED FASTING GLUCOSE: ICD-10-CM

## 2023-12-06 LAB
ALBUMIN SERPL-MCNC: 3.9 G/DL (ref 3.4–4.8)
ALBUMIN/GLOB SERPL: 1 RATIO (ref 1.1–2)
ALP SERPL-CCNC: 75 UNIT/L (ref 40–150)
ALT SERPL-CCNC: 43 UNIT/L (ref 0–55)
AST SERPL-CCNC: 32 UNIT/L (ref 5–34)
BASOPHILS # BLD AUTO: 0.04 X10(3)/MCL
BASOPHILS NFR BLD AUTO: 0.8 %
BILIRUB SERPL-MCNC: 1.1 MG/DL
BUN SERPL-MCNC: 15 MG/DL (ref 8.4–25.7)
CALCIUM SERPL-MCNC: 9.8 MG/DL (ref 8.8–10)
CHLORIDE SERPL-SCNC: 106 MMOL/L (ref 98–107)
CHOLEST SERPL-MCNC: 198 MG/DL
CHOLEST/HDLC SERPL: 6 {RATIO} (ref 0–5)
CO2 SERPL-SCNC: 25 MMOL/L (ref 23–31)
CREAT SERPL-MCNC: 1.36 MG/DL (ref 0.73–1.18)
EOSINOPHIL # BLD AUTO: 0.28 X10(3)/MCL (ref 0–0.9)
EOSINOPHIL NFR BLD AUTO: 5.7 %
ERYTHROCYTE [DISTWIDTH] IN BLOOD BY AUTOMATED COUNT: 12.5 % (ref 11.5–17)
EST. AVERAGE GLUCOSE BLD GHB EST-MCNC: 119.8 MG/DL
GFR SERPLBLD CREATININE-BSD FMLA CKD-EPI: 59 MLS/MIN/1.73/M2
GLOBULIN SER-MCNC: 4 GM/DL (ref 2.4–3.5)
GLUCOSE SERPL-MCNC: 95 MG/DL (ref 82–115)
HBA1C MFR BLD: 5.8 %
HCT VFR BLD AUTO: 50.5 % (ref 42–52)
HDLC SERPL-MCNC: 32 MG/DL (ref 35–60)
HGB BLD-MCNC: 16.5 G/DL (ref 14–18)
IMM GRANULOCYTES # BLD AUTO: 0.01 X10(3)/MCL (ref 0–0.04)
IMM GRANULOCYTES NFR BLD AUTO: 0.2 %
LDLC SERPL CALC-MCNC: 140 MG/DL (ref 50–140)
LYMPHOCYTES # BLD AUTO: 1.18 X10(3)/MCL (ref 0.6–4.6)
LYMPHOCYTES NFR BLD AUTO: 24.2 %
MCH RBC QN AUTO: 30.6 PG (ref 27–31)
MCHC RBC AUTO-ENTMCNC: 32.7 G/DL (ref 33–36)
MCV RBC AUTO: 93.7 FL (ref 80–94)
MONOCYTES # BLD AUTO: 0.44 X10(3)/MCL (ref 0.1–1.3)
MONOCYTES NFR BLD AUTO: 9 %
NEUTROPHILS # BLD AUTO: 2.92 X10(3)/MCL (ref 2.1–9.2)
NEUTROPHILS NFR BLD AUTO: 60.1 %
PLATELET # BLD AUTO: 279 X10(3)/MCL (ref 130–400)
PMV BLD AUTO: 8.9 FL (ref 7.4–10.4)
POTASSIUM SERPL-SCNC: 5.2 MMOL/L (ref 3.5–5.1)
PROT SERPL-MCNC: 7.9 GM/DL (ref 5.8–7.6)
RBC # BLD AUTO: 5.39 X10(6)/MCL (ref 4.7–6.1)
SODIUM SERPL-SCNC: 139 MMOL/L (ref 136–145)
TRIGL SERPL-MCNC: 129 MG/DL (ref 34–140)
TSH SERPL-ACNC: 1.12 UIU/ML (ref 0.35–4.94)
VLDLC SERPL CALC-MCNC: 26 MG/DL
WBC # SPEC AUTO: 4.87 X10(3)/MCL (ref 4.5–11.5)

## 2023-12-06 PROCEDURE — 80061 LIPID PANEL: CPT

## 2023-12-06 PROCEDURE — 83036 HEMOGLOBIN GLYCOSYLATED A1C: CPT

## 2023-12-06 PROCEDURE — 85025 COMPLETE CBC W/AUTO DIFF WBC: CPT

## 2023-12-06 PROCEDURE — 36415 COLL VENOUS BLD VENIPUNCTURE: CPT

## 2023-12-06 PROCEDURE — 84443 ASSAY THYROID STIM HORMONE: CPT

## 2023-12-06 PROCEDURE — 80053 COMPREHEN METABOLIC PANEL: CPT

## 2023-12-08 ENCOUNTER — LAB VISIT (OUTPATIENT)
Dept: LAB | Facility: HOSPITAL | Age: 63
End: 2023-12-08
Attending: FAMILY MEDICINE
Payer: MEDICAID

## 2023-12-08 DIAGNOSIS — E78.5 HYPERLIPIDEMIA, UNSPECIFIED HYPERLIPIDEMIA TYPE: Primary | ICD-10-CM

## 2023-12-08 LAB
ALBUMIN SERPL-MCNC: 3.7 G/DL (ref 3.4–4.8)
ALBUMIN/GLOB SERPL: 1 RATIO (ref 1.1–2)
ALP SERPL-CCNC: 79 UNIT/L (ref 40–150)
ALT SERPL-CCNC: 41 UNIT/L (ref 0–55)
AMYLASE SERPL-CCNC: 35 UNIT/L (ref 25–125)
AST SERPL-CCNC: 26 UNIT/L (ref 5–34)
BASOPHILS # BLD AUTO: 0.03 X10(3)/MCL
BASOPHILS NFR BLD AUTO: 0.7 %
BILIRUB SERPL-MCNC: 1.4 MG/DL
BUN SERPL-MCNC: 13 MG/DL (ref 8.4–25.7)
CALCIUM SERPL-MCNC: 9.7 MG/DL (ref 8.8–10)
CHLORIDE SERPL-SCNC: 106 MMOL/L (ref 98–107)
CHOLEST SERPL-MCNC: 189 MG/DL
CHOLEST/HDLC SERPL: 6 {RATIO} (ref 0–5)
CO2 SERPL-SCNC: 27 MMOL/L (ref 23–31)
CREAT SERPL-MCNC: 1.26 MG/DL (ref 0.73–1.18)
EOSINOPHIL # BLD AUTO: 0.34 X10(3)/MCL (ref 0–0.9)
EOSINOPHIL NFR BLD AUTO: 8.3 %
ERYTHROCYTE [DISTWIDTH] IN BLOOD BY AUTOMATED COUNT: 12.5 % (ref 11.5–17)
GFR SERPLBLD CREATININE-BSD FMLA CKD-EPI: >60 MLS/MIN/1.73/M2
GLOBULIN SER-MCNC: 3.8 GM/DL (ref 2.4–3.5)
GLUCOSE SERPL-MCNC: 101 MG/DL (ref 82–115)
HCT VFR BLD AUTO: 49.3 % (ref 42–52)
HDLC SERPL-MCNC: 33 MG/DL (ref 35–60)
HGB BLD-MCNC: 16 G/DL (ref 14–18)
IMM GRANULOCYTES # BLD AUTO: 0.01 X10(3)/MCL (ref 0–0.04)
IMM GRANULOCYTES NFR BLD AUTO: 0.2 %
LDLC SERPL CALC-MCNC: 131 MG/DL (ref 50–140)
LIPASE SERPL-CCNC: 13 U/L
LYMPHOCYTES # BLD AUTO: 1.31 X10(3)/MCL (ref 0.6–4.6)
LYMPHOCYTES NFR BLD AUTO: 31.9 %
MCH RBC QN AUTO: 30.4 PG (ref 27–31)
MCHC RBC AUTO-ENTMCNC: 32.5 G/DL (ref 33–36)
MCV RBC AUTO: 93.7 FL (ref 80–94)
MONOCYTES # BLD AUTO: 0.42 X10(3)/MCL (ref 0.1–1.3)
MONOCYTES NFR BLD AUTO: 10.2 %
NEUTROPHILS # BLD AUTO: 2 X10(3)/MCL (ref 2.1–9.2)
NEUTROPHILS NFR BLD AUTO: 48.7 %
PLATELET # BLD AUTO: 272 X10(3)/MCL (ref 130–400)
PMV BLD AUTO: 9.1 FL (ref 7.4–10.4)
POTASSIUM SERPL-SCNC: 4.6 MMOL/L (ref 3.5–5.1)
PROT SERPL-MCNC: 7.5 GM/DL (ref 5.8–7.6)
RBC # BLD AUTO: 5.26 X10(6)/MCL (ref 4.7–6.1)
SODIUM SERPL-SCNC: 139 MMOL/L (ref 136–145)
TRIGL SERPL-MCNC: 123 MG/DL (ref 34–140)
TSH SERPL-ACNC: 1.32 UIU/ML (ref 0.35–4.94)
VLDLC SERPL CALC-MCNC: 25 MG/DL
WBC # SPEC AUTO: 4.11 X10(3)/MCL (ref 4.5–11.5)

## 2023-12-08 PROCEDURE — 36415 COLL VENOUS BLD VENIPUNCTURE: CPT

## 2023-12-08 PROCEDURE — 80053 COMPREHEN METABOLIC PANEL: CPT

## 2023-12-08 PROCEDURE — 80061 LIPID PANEL: CPT

## 2023-12-08 PROCEDURE — 83690 ASSAY OF LIPASE: CPT

## 2023-12-08 PROCEDURE — 82150 ASSAY OF AMYLASE: CPT

## 2023-12-08 PROCEDURE — 85025 COMPLETE CBC W/AUTO DIFF WBC: CPT

## 2023-12-08 PROCEDURE — 84443 ASSAY THYROID STIM HORMONE: CPT

## 2024-01-03 DIAGNOSIS — N40.0 BPH WITHOUT OBSTRUCTION/LOWER URINARY TRACT SYMPTOMS: Primary | ICD-10-CM

## 2024-01-17 DIAGNOSIS — R10.84 ABDOMINAL PAIN, GENERALIZED: Primary | ICD-10-CM

## 2024-01-23 ENCOUNTER — HOSPITAL ENCOUNTER (OUTPATIENT)
Dept: RADIOLOGY | Facility: HOSPITAL | Age: 64
Discharge: HOME | End: 2024-01-23
Attending: FAMILY MEDICINE
Payer: MEDICAID

## 2024-01-23 DIAGNOSIS — R10.84 ABDOMINAL PAIN, GENERALIZED: ICD-10-CM

## 2024-01-23 PROCEDURE — 76700 US EXAM ABDOM COMPLETE: CPT | Mod: TC

## 2024-01-23 PROCEDURE — 76856 US EXAM PELVIC COMPLETE: CPT | Mod: TC

## 2024-03-06 ENCOUNTER — HOSPITAL ENCOUNTER (EMERGENCY)
Facility: HOSPITAL | Age: 64
Discharge: HOME | End: 2024-03-06
Attending: EMERGENCY MEDICINE
Payer: MEDICAID

## 2024-03-06 VITALS
TEMPERATURE: 98 F | WEIGHT: 224 LBS | DIASTOLIC BLOOD PRESSURE: 114 MMHG | SYSTOLIC BLOOD PRESSURE: 181 MMHG | BODY MASS INDEX: 43.98 KG/M2 | HEIGHT: 60 IN | HEART RATE: 72 BPM | RESPIRATION RATE: 18 BRPM | OXYGEN SATURATION: 95 %

## 2024-03-06 DIAGNOSIS — R52 PAIN: ICD-10-CM

## 2024-03-06 DIAGNOSIS — M54.12 CERVICAL RADICULOPATHY: Primary | ICD-10-CM

## 2024-03-06 PROCEDURE — 96372 THER/PROPH/DIAG INJ SC/IM: CPT | Performed by: EMERGENCY MEDICINE

## 2024-03-06 PROCEDURE — 99284 EMERGENCY DEPT VISIT MOD MDM: CPT | Mod: 25

## 2024-03-06 PROCEDURE — 63600175 PHARM REV CODE 636 W HCPCS: Performed by: EMERGENCY MEDICINE

## 2024-03-06 RX ORDER — TRAMADOL HYDROCHLORIDE 50 MG/1
50 TABLET ORAL EVERY 6 HOURS PRN
Qty: 20 TABLET | Refills: 0 | Status: SHIPPED | OUTPATIENT
Start: 2024-03-06 | End: 2024-03-11

## 2024-03-06 RX ORDER — DEXAMETHASONE SODIUM PHOSPHATE 4 MG/ML
8 INJECTION, SOLUTION INTRA-ARTICULAR; INTRALESIONAL; INTRAMUSCULAR; INTRAVENOUS; SOFT TISSUE
Status: COMPLETED | OUTPATIENT
Start: 2024-03-06 | End: 2024-03-06

## 2024-03-06 RX ORDER — CYCLOBENZAPRINE HCL 10 MG
10 TABLET ORAL 3 TIMES DAILY PRN
Qty: 15 TABLET | Refills: 0 | Status: SHIPPED | OUTPATIENT
Start: 2024-03-06 | End: 2024-03-11

## 2024-03-06 RX ORDER — GABAPENTIN 300 MG/1
300 CAPSULE ORAL 3 TIMES DAILY
Qty: 42 CAPSULE | Refills: 0 | Status: SHIPPED | OUTPATIENT
Start: 2024-03-06 | End: 2024-03-20

## 2024-03-06 RX ORDER — PREDNISONE 20 MG/1
60 TABLET ORAL DAILY
Qty: 15 TABLET | Refills: 0 | Status: SHIPPED | OUTPATIENT
Start: 2024-03-06 | End: 2024-03-11

## 2024-03-06 RX ADMIN — DEXAMETHASONE SODIUM PHOSPHATE 8 MG: 4 INJECTION, SOLUTION INTRA-ARTICULAR; INTRALESIONAL; INTRAMUSCULAR; INTRAVENOUS; SOFT TISSUE at 10:03

## 2024-03-06 NOTE — ED PROVIDER NOTES
Encounter Date: 3/6/2024       History     Chief Complaint   Patient presents with    Shoulder Pain     Pt c/o pain to rt shoulder and neck x 2 weeks, states has an old shoulder injury on rt side and pain returned 2 weeks ago.     The history is provided by the patient.   Shoulder Pain  This is a recurrent problem. The current episode started more than 1 week ago. The problem occurs constantly. The problem has not changed since onset.Pertinent negatives include no chest pain and no shortness of breath. Nothing aggravates the symptoms. Nothing relieves the symptoms.   Reports remote shoulder injury 20+ years ago.  States this pain is radiating into his neck.  No recent trauma.    Review of patient's allergies indicates:  No Known Allergies  Past Medical History:   Diagnosis Date    Anxiety disorder, unspecified     Cross esophagus     COPD (chronic obstructive pulmonary disease)     DVT (deep venous thrombosis)     Right femoral vein    Frequent urination     GERD (gastroesophageal reflux disease)     High cholesterol     Hypertension     PVD (peripheral vascular disease)     Seasonal allergies      Past Surgical History:   Procedure Laterality Date    EGD, WITH CLOSED BIOPSY N/A 5/25/2023    Procedure: EGD, WITH CLOSED BIOPSY;  Surgeon: Michele Willson III, MD;  Location: [facility];  Service: Endoscopy;  Laterality: N/A;  A.) Bx of antrum for h-pylori  B.) Bx of antrum pathology analysis  C.) Bx of angularis    ESOPHAGOGASTRODUODENOSCOPY N/A 5/25/2023    Procedure: EGD (ESOPHAGOGASTRODUODENOSCOPY);  Surgeon: Michele Willson III, MD;  Location: [facility];  Service: Endoscopy;  Laterality: N/A;  Moderate Gastritis    INTRAVASCULAR ULTRASOUND, NON-CORONARY  10/11/2022    Procedure: Intravascular Ultrasound, Non-Coronary;  Surgeon: Timbo Clark MD;  Location: [facility] CATH LAB;  Service: Cardiology;;     Family History   Problem Relation Age of Onset    Hypertension Mother     Diabetes Mother     Diabetes Father      Hypertension Father      Social History     Tobacco Use    Smoking status: Former     Current packs/day: 0.00     Types: Cigarettes     Quit date:      Years since quittin.1    Smokeless tobacco: Never   Substance Use Topics    Alcohol use: Yes     Comment: Occasionally     Review of Systems   Constitutional:  Negative for fever.   HENT:  Negative for sore throat.    Respiratory:  Negative for shortness of breath.    Cardiovascular:  Negative for chest pain.   Gastrointestinal:  Negative for nausea.   Genitourinary:  Negative for dysuria.   Musculoskeletal:  Negative for back pain.   Skin:  Negative for rash.   Neurological:  Negative for weakness.   Hematological:  Does not bruise/bleed easily.       Physical Exam     Initial Vitals [24 0932]   BP Pulse Resp Temp SpO2   (!) 181/114 72 18 97.7 °F (36.5 °C) 95 %      MAP       --         Physical Exam    Nursing note and vitals reviewed.  Constitutional: He appears well-developed and well-nourished.   HENT:   Head: Normocephalic and atraumatic.   Right Ear: External ear normal.   Left Ear: External ear normal.   Nose: Nose normal.   Eyes: Conjunctivae and EOM are normal. Pupils are equal, round, and reactive to light.   Neck: Neck supple.       Normal range of motion.  Cardiovascular:  Normal rate, regular rhythm, normal heart sounds and intact distal pulses.           Pulmonary/Chest: Breath sounds normal.   Abdominal: Abdomen is soft. Bowel sounds are normal.   Musculoskeletal:      Right shoulder: Decreased range of motion.        Arms:       Cervical back: Normal range of motion and neck supple.      Comments: Pain reproduced with internal rotation     Neurological: He is alert and oriented to person, place, and time. He has normal strength. GCS score is 15. GCS eye subscore is 4. GCS verbal subscore is 5. GCS motor subscore is 6.   Skin: Skin is warm and dry. Capillary refill takes less than 2 seconds.   Psychiatric: He has a normal mood and  affect. His behavior is normal. Judgment and thought content normal.         ED Course   Procedures  Labs Reviewed - No data to display       Imaging Results              X-Ray Cervical Spine AP And Lateral (Preliminary result)  Result time 03/06/24 11:05:39      Wet Read by Ashu Lopez MD (03/06/24 11:05:39, [facility] - Emergency Dept, Emergency Medicine)    Degenerative changes, NAF                                     X-Ray Shoulder Trauma Right (Preliminary result)  Result time 03/06/24 11:06:10      Wet Read by Ashu Lopez MD (03/06/24 11:06:10, [facility] - Emergency Dept, Emergency Medicine)    NAF                                     Medications   dexAMETHasone injection 8 mg (8 mg Intramuscular Given 3/6/24 1049)     Medical Decision Making  Amount and/or Complexity of Data Reviewed  Radiology: ordered and independent interpretation performed. Decision-making details documented in ED Course.    Risk  Prescription drug management.    Differential includes:  strain, sprain, OA, radiculopathy, spasm.  Will obtain x-rays of C-spine and right shoulder and give IM steroids (has h/o CKD, so will avoid NSAID's)                                  Clinical Impression:  Final diagnoses:  [R52] Pain  [M54.12] Cervical radiculopathy (Primary)          ED Disposition Condition    Discharge Stable          ED Prescriptions       Medication Sig Dispense Start Date End Date Auth. Provider    predniSONE (DELTASONE) 20 MG tablet Take 3 tablets (60 mg total) by mouth once daily. for 5 days 15 tablet 3/6/2024 3/11/2024 Ashu Lopez MD    gabapentin (NEURONTIN) 300 MG capsule Take 1 capsule (300 mg total) by mouth 3 (three) times daily. for 14 days 42 capsule 3/6/2024 3/20/2024 Ashu Lopez MD    cyclobenzaprine (FLEXERIL) 10 MG tablet Take 1 tablet (10 mg total) by mouth 3 (three) times daily as needed for Muscle spasms. 15 tablet 3/6/2024 3/11/2024  Ashu Lopez MD    traMADoL (ULTRAM) 50 mg tablet Take 1 tablet (50 mg total) by mouth every 6 (six) hours as needed for Pain. 20 tablet 3/6/2024 3/11/2024 Ashu Lopez MD          Follow-up Information       Follow up With Specialties Details Why Contact Info    Mildred Silverio MD Family Medicine Schedule an appointment as soon as possible for a visit   [address] [place] [id number]  [phone]               Ashu Lopez MD  03/06/24 4912

## 2024-03-14 ENCOUNTER — HOSPITAL ENCOUNTER (EMERGENCY)
Facility: HOSPITAL | Age: 64
Discharge: HOME | End: 2024-03-14
Attending: INTERNAL MEDICINE
Payer: MEDICAID

## 2024-03-14 VITALS
DIASTOLIC BLOOD PRESSURE: 75 MMHG | BODY MASS INDEX: 30.34 KG/M2 | RESPIRATION RATE: 18 BRPM | HEIGHT: 72 IN | SYSTOLIC BLOOD PRESSURE: 120 MMHG | WEIGHT: 224 LBS | TEMPERATURE: 97 F | HEART RATE: 66 BPM | OXYGEN SATURATION: 94 %

## 2024-03-14 DIAGNOSIS — F41.9 ANXIETY: Primary | ICD-10-CM

## 2024-03-14 DIAGNOSIS — R07.9 CHEST PAIN: ICD-10-CM

## 2024-03-14 LAB
ALBUMIN SERPL-MCNC: 3.5 G/DL (ref 3.4–4.8)
ALBUMIN/GLOB SERPL: 1 RATIO (ref 1.1–2)
ALP SERPL-CCNC: 68 UNIT/L (ref 40–150)
ALT SERPL-CCNC: 33 UNIT/L (ref 0–55)
AST SERPL-CCNC: 20 UNIT/L (ref 5–34)
BASOPHILS # BLD AUTO: 0.03 X10(3)/MCL
BASOPHILS NFR BLD AUTO: 0.4 %
BILIRUB SERPL-MCNC: 1.1 MG/DL
BNP BLD-MCNC: 20 PG/ML
BUN SERPL-MCNC: 27 MG/DL (ref 8.4–25.7)
CALCIUM SERPL-MCNC: 9.2 MG/DL (ref 8.8–10)
CHLORIDE SERPL-SCNC: 103 MMOL/L (ref 98–107)
CO2 SERPL-SCNC: 24 MMOL/L (ref 23–31)
CREAT SERPL-MCNC: 1.4 MG/DL (ref 0.73–1.18)
EOSINOPHIL # BLD AUTO: 0.24 X10(3)/MCL (ref 0–0.9)
EOSINOPHIL NFR BLD AUTO: 3.3 %
ERYTHROCYTE [DISTWIDTH] IN BLOOD BY AUTOMATED COUNT: 13.4 % (ref 11.5–17)
GFR SERPLBLD CREATININE-BSD FMLA CKD-EPI: 57 MLS/MIN/1.73/M2
GLOBULIN SER-MCNC: 3.5 GM/DL (ref 2.4–3.5)
GLUCOSE SERPL-MCNC: 134 MG/DL (ref 82–115)
HCT VFR BLD AUTO: 47.7 % (ref 42–52)
HGB BLD-MCNC: 15.3 G/DL (ref 14–18)
IMM GRANULOCYTES # BLD AUTO: 0.11 X10(3)/MCL (ref 0–0.04)
IMM GRANULOCYTES NFR BLD AUTO: 1.5 %
LYMPHOCYTES # BLD AUTO: 1.23 X10(3)/MCL (ref 0.6–4.6)
LYMPHOCYTES NFR BLD AUTO: 17.1 %
MCH RBC QN AUTO: 29.9 PG (ref 27–31)
MCHC RBC AUTO-ENTMCNC: 32.1 G/DL (ref 33–36)
MCV RBC AUTO: 93.2 FL (ref 80–94)
MONOCYTES # BLD AUTO: 0.76 X10(3)/MCL (ref 0.1–1.3)
MONOCYTES NFR BLD AUTO: 10.5 %
NEUTROPHILS # BLD AUTO: 4.84 X10(3)/MCL (ref 2.1–9.2)
NEUTROPHILS NFR BLD AUTO: 67.2 %
OHS QRS DURATION: 96 MS
OHS QTC CALCULATION: 433 MS
PLATELET # BLD AUTO: 289 X10(3)/MCL (ref 130–400)
PMV BLD AUTO: 9 FL (ref 7.4–10.4)
POCT GLUCOSE: 155 MG/DL (ref 70–110)
POTASSIUM SERPL-SCNC: 4.6 MMOL/L (ref 3.5–5.1)
PROT SERPL-MCNC: 7 GM/DL (ref 5.8–7.6)
RBC # BLD AUTO: 5.12 X10(6)/MCL (ref 4.7–6.1)
SODIUM SERPL-SCNC: 135 MMOL/L (ref 136–145)
TROPONIN I SERPL-MCNC: <0.01 NG/ML (ref 0–0.04)
WBC # SPEC AUTO: 7.21 X10(3)/MCL (ref 4.5–11.5)

## 2024-03-14 PROCEDURE — 93010 ELECTROCARDIOGRAM REPORT: CPT | Mod: ,,, | Performed by: INTERNAL MEDICINE

## 2024-03-14 PROCEDURE — 80053 COMPREHEN METABOLIC PANEL: CPT | Performed by: INTERNAL MEDICINE

## 2024-03-14 PROCEDURE — 83880 ASSAY OF NATRIURETIC PEPTIDE: CPT | Performed by: INTERNAL MEDICINE

## 2024-03-14 PROCEDURE — 85025 COMPLETE CBC W/AUTO DIFF WBC: CPT | Performed by: INTERNAL MEDICINE

## 2024-03-14 PROCEDURE — 82962 GLUCOSE BLOOD TEST: CPT

## 2024-03-14 PROCEDURE — 84484 ASSAY OF TROPONIN QUANT: CPT | Performed by: INTERNAL MEDICINE

## 2024-03-14 PROCEDURE — 25000003 PHARM REV CODE 250: Performed by: INTERNAL MEDICINE

## 2024-03-14 PROCEDURE — 93005 ELECTROCARDIOGRAM TRACING: CPT

## 2024-03-14 PROCEDURE — 99285 EMERGENCY DEPT VISIT HI MDM: CPT | Mod: 25

## 2024-03-14 RX ORDER — ALUMINUM HYDROXIDE, MAGNESIUM HYDROXIDE, AND SIMETHICONE 1200; 120; 1200 MG/30ML; MG/30ML; MG/30ML
30 SUSPENSION ORAL ONCE
Status: COMPLETED | OUTPATIENT
Start: 2024-03-14 | End: 2024-03-14

## 2024-03-14 RX ORDER — NAPROXEN SODIUM 220 MG/1
324 TABLET, FILM COATED ORAL
Status: COMPLETED | OUTPATIENT
Start: 2024-03-14 | End: 2024-03-14

## 2024-03-14 RX ORDER — ALPRAZOLAM 0.25 MG/1
0.25 TABLET ORAL
Status: COMPLETED | OUTPATIENT
Start: 2024-03-14 | End: 2024-03-14

## 2024-03-14 RX ADMIN — ALPRAZOLAM 0.25 MG: 0.25 TABLET ORAL at 12:03

## 2024-03-14 RX ADMIN — ALUMINUM HYDROXIDE, MAGNESIUM HYDROXIDE, AND DIMETHICONE 30 ML: 200; 20; 200 SUSPENSION ORAL at 11:03

## 2024-03-14 RX ADMIN — ASPIRIN 81 MG CHEWABLE TABLET 324 MG: 81 TABLET CHEWABLE at 11:03

## 2024-03-14 NOTE — ED PROVIDER NOTES
03/14/2024     11:32 AM    Source of History:  History obtained from the patient.     Chief complaint:  From Nurse Triage:  Chest Pain (Pt ambulated with steady gait to room 7 with c/o chest pain that started around 0130 this morning, pt now has mouth & bilateral arm numbness/tingling. Pt states that the arm tingling has been happening for the past week. Pt is worried that he is having a heart attack.)    HISTORY OF PRESENT ILLNES:  Marko Heard is a 64 y.o. male  has a past medical history of Anxiety disorder, unspecified, Cross esophagus, COPD (chronic obstructive pulmonary disease), DVT (deep venous thrombosis), Frequent urination, GERD (gastroesophageal reflux disease), High cholesterol, Hypertension, PVD (peripheral vascular disease), and Seasonal allergies. presenting with Chest Pain (Pt ambulated with steady gait to room 7 with c/o chest pain that started around 0130 this morning, pt now has mouth & bilateral arm numbness/tingling. Pt states that the arm tingling has been happening for the past week. Pt is worried that he is having a heart attack.)    REVIEW OF SYSTEMS:   Constitutional symptoms:  No Fever. No Chills    Skin symptoms:  No Rash.    Eye symptoms:  No Visual disturbance reported.   ENMT symptoms:  No Sore throat,    Respiratory symptoms:  No Shortness of Breath, no Cough, no Wheezing.    Cardiovascular symptoms:  Chest Pain left sided, No Palpitations.   Gastrointestinal symptoms:  Acid reflux bilateral No Abdominal Pain, No Nausea, No Vomiting, No Diarrhea, No Constipation.    Genitourinary symptoms:  No Dysuria,    Musculoskeletal symptoms:  No Back pain,    Neurologic symptoms:  No Headache, No Dizziness.    Psychiatric symptoms:  No Anxiety, No Depression, No Substance Abuse.              Additional review of systems information: Patient Denies Any Other Complaints.    All Other Systems Reviewed With Patient And Negative.    ALLEGIES:  Review of patient's allergies indicates:  No Known  Allergies    MEDICINE LIST:  Current Outpatient Medications   Medication Instructions    amitriptyline (ELAVIL) 25 mg, Oral, Nightly    amLODIPine (NORVASC) 5 mg, Oral, Nightly    clopidogreL (PLAVIX) 75 mg, Oral, Daily    dicyclomine (BENTYL) 20 mg, Oral, Every 8 hours PRN    ezetimibe (ZETIA) 10 mg, Oral, Nightly    gabapentin (NEURONTIN) 300 mg, Oral, 3 times daily    LINZESS 145 mcg, Oral, Daily PRN    lisinopriL (PRINIVIL,ZESTRIL) 40 mg, Oral, Every morning    metoprolol succinate (TOPROL-XL) 50 mg, Oral, 2 times daily    pantoprazole (PROTONIX) 40 mg, Oral, Every morning    XARELTO 20 mg, Oral, Every morning, Stopped 5/20/23     PMH:  As per HPI and below:    Reviewed and updated in chart.    PAST MEDICAL HISTORY:  Past Medical History:   Diagnosis Date    Anxiety disorder, unspecified     Cross esophagus     COPD (chronic obstructive pulmonary disease)     DVT (deep venous thrombosis)     Right femoral vein    Frequent urination     GERD (gastroesophageal reflux disease)     High cholesterol     Hypertension     PVD (peripheral vascular disease)     Seasonal allergies         PAST SURGICAL HISTORY:  Past Surgical History:   Procedure Laterality Date    EGD, WITH CLOSED BIOPSY N/A 5/25/2023    Procedure: EGD, WITH CLOSED BIOPSY;  Surgeon: Michele Willson III, MD;  Location: [facility];  Service: Endoscopy;  Laterality: N/A;  A.) Bx of antrum for h-pylori  B.) Bx of antrum pathology analysis  C.) Bx of angularis    ESOPHAGOGASTRODUODENOSCOPY N/A 5/25/2023    Procedure: EGD (ESOPHAGOGASTRODUODENOSCOPY);  Surgeon: Michele Willson III, MD;  Location: [facility];  Service: Endoscopy;  Laterality: N/A;  Moderate Gastritis    INTRAVASCULAR ULTRASOUND, NON-CORONARY  10/11/2022    Procedure: Intravascular Ultrasound, Non-Coronary;  Surgeon: Timbo Clark MD;  Location: [facility] CATH LAB;  Service: Cardiology;;       SOCIAL HISTORY:  Social History     Tobacco Use    Smoking status: Former     Current packs/day: 0.00      Types: Cigarettes     Quit date:      Years since quittin.2    Smokeless tobacco: Never   Substance Use Topics    Alcohol use: Yes     Comment: Occasionally    Drug use: Never       FAMILY HISTORY:  Family History   Problem Relation Age of Onset    Hypertension Mother     Diabetes Mother     Diabetes Father     Hypertension Father         PROBLEM LIST:  Patient Active Problem List   Diagnosis    Venous insufficiency    May-Thurner syndrome        PHYSICAL EXAM:      ED Triage Vitals   BP    Pulse    Resp    Temp    SpO2         Vital Signs: Reviewed As In Chart.  General:  Alert, No Cardiorespiratory Distress Noted.   Eye:  Extraocular Movements Are Intact.   ENT: Mucus membranes are moist.   Cardiovascular:  Regular Rate And Rhythm, No Murmur, No Pedal Edema.    Respiratory:  Respirations Nonlabored, No Respiratory Distress, Good Bilateral Air Entry, No Rales, No Rhonchi.    Gastrointestinal:  Soft, Non Distended, Non Tenderness, Normal Bowel Sounds.    Neurological:  Alert And Oriented To Person, Place, Time, And Situation, Normal Motor Observed, Normal Speech Observed.  Some tremor bilateral upper and lower extremities, walked in in a stable gait,  Musculoskeletal:  No Gross Deformity Noted.     Psychiatric:  Anxious, jittery, tremor, Cooperative.      ED WORKUP FOR MEDICAL DECISION MAKING:    ED ORDERS:  Orders Placed This Encounter   Procedures    X-ray Chest AP Portable    Comprehensive metabolic panel    CBC auto differential    Troponin I    Brain natriuretic peptide    CBC with Differential    Diet NPO    Vital signs    Cardiac Monitoring - Adult    Oxygen Continuous    Pulse Oximetry Continuous    EKG 12-LEAD    Insert saline lock       ED MEDICINES:  Medications   aspirin chewable tablet 324 mg (324 mg Oral Given 3/14/24 1140)   aluminum-magnesium hydroxide-simethicone 200-200-20 mg/5 mL suspension 30 mL (30 mLs Oral Given 3/14/24 1140)   ALPRAZolam tablet 0.25 mg (0.25 mg Oral Given 3/14/24  1210)          ECG Results              EKG 12-LEAD (Preliminary result)  Result time 03/14/24 12:15:14      Wet Read by Nicole Rothman MD (03/14/24 12:15:14, [facility] - Emergency Dept, Emergency Medicine)    EKG Initial Reading: Independently Interpreted by Nicole Rothman MD. independently as: No STEMI. Rhythm: Normal Sinus Rhythm, Rate 78. Ectopy: No Ectopy. Conduction: Normal. ST Segments:  Nonspecific ST changes. T Waves: Normal. Axis: Normal.                                     ED LABS ORDERED AND REVIEWED:  Admission on 03/14/2024   Component Date Value Ref Range Status    Sodium Level 03/14/2024 135 (L)  136 - 145 mmol/L Final    Potassium Level 03/14/2024 4.6  3.5 - 5.1 mmol/L Final    Chloride 03/14/2024 103  98 - 107 mmol/L Final    Carbon Dioxide 03/14/2024 24  23 - 31 mmol/L Final    Glucose Level 03/14/2024 134 (H)  82 - 115 mg/dL Final    Blood Urea Nitrogen 03/14/2024 27.0 (H)  8.4 - 25.7 mg/dL Final    Creatinine 03/14/2024 1.40 (H)  0.73 - 1.18 mg/dL Final    Calcium Level Total 03/14/2024 9.2  8.8 - 10.0 mg/dL Final    Protein Total 03/14/2024 7.0  5.8 - 7.6 gm/dL Final    Albumin Level 03/14/2024 3.5  3.4 - 4.8 g/dL Final    Globulin 03/14/2024 3.5  2.4 - 3.5 gm/dL Final    Albumin/Globulin Ratio 03/14/2024 1.0 (L)  1.1 - 2.0 ratio Final    Bilirubin Total 03/14/2024 1.1  <=1.5 mg/dL Final    Alkaline Phosphatase 03/14/2024 68  40 - 150 unit/L Final    Alanine Aminotransferase 03/14/2024 33  0 - 55 unit/L Final    Aspartate Aminotransferase 03/14/2024 20  5 - 34 unit/L Final    eGFR 03/14/2024 57  mls/min/1.73/m2 Final    Troponin-I 03/14/2024 <0.010  0.000 - 0.045 ng/mL Final    Natriuretic Peptide 03/14/2024 20.0  <=100.0 pg/mL Final    WBC 03/14/2024 7.21  4.50 - 11.50 x10(3)/mcL Final    RBC 03/14/2024 5.12  4.70 - 6.10 x10(6)/mcL Final    Hgb 03/14/2024 15.3  14.0 - 18.0 g/dL Final    Hct 03/14/2024 47.7  42.0 - 52.0 % Final    MCV 03/14/2024 93.2  80.0 - 94.0 fL Final     MCH 03/14/2024 29.9  27.0 - 31.0 pg Final    MCHC 03/14/2024 32.1 (L)  33.0 - 36.0 g/dL Final    RDW 03/14/2024 13.4  11.5 - 17.0 % Final    Platelet 03/14/2024 289  130 - 400 x10(3)/mcL Final    MPV 03/14/2024 9.0  7.4 - 10.4 fL Final    Neut % 03/14/2024 67.2  % Final    Lymph % 03/14/2024 17.1  % Final    Mono % 03/14/2024 10.5  % Final    Eos % 03/14/2024 3.3  % Final    Basophil % 03/14/2024 0.4  % Final    Lymph # 03/14/2024 1.23  0.6 - 4.6 x10(3)/mcL Final    Neut # 03/14/2024 4.84  2.1 - 9.2 x10(3)/mcL Final    Mono # 03/14/2024 0.76  0.1 - 1.3 x10(3)/mcL Final    Eos # 03/14/2024 0.24  0 - 0.9 x10(3)/mcL Final    Baso # 03/14/2024 0.03  <=0.2 x10(3)/mcL Final    IG# 03/14/2024 0.11 (H)  0 - 0.04 x10(3)/mcL Final    IG% 03/14/2024 1.5  % Final    POCT Glucose 03/14/2024 155 (H)  70 - 110 mg/dL Final       RADIOLOGY STUDIES ORDERED AND REVIEWED:  Imaging Results              X-ray Chest AP Portable (Final result)  Result time 03/14/24 12:08:31      Final result by Phil Devine MD (03/14/24 12:08:31)                   Impression:      1. No active cardiopulmonary disease identified      Electronically signed by: Phil Devine  Date:    03/14/2024  Time:    12:08               Narrative:    EXAMINATION:  XR CHEST AP PORTABLE    CLINICAL HISTORY:  Chest Pain;, .    COMPARISON:  06/27/2023    FINDINGS:  An AP view or more reveals the heart to be normal in size.  The trachea is midline.  No definite infiltrate or effusion is seen.  There is mild hyper aeration of the right upper lung.  Bony structures appear grossly intact.                        Wet Read by Rothman, Ghayas A, MD (03/14/24 12:05:00, [facility] - Emergency Dept, Emergency Medicine)    Chest One View:  Independently reviewed and/or interpreted by Nicole Rothman MD.  No Focal Consolidation, No Acute Cardiopulmonary abnormality identified grossly.                                    MEDICAL DECISION MAKING:    Marko Heard is 64  y.o. male who  has a past medical history of Anxiety disorder, unspecified, Cross esophagus, COPD (chronic obstructive pulmonary disease), DVT (deep venous thrombosis), Frequent urination, GERD (gastroesophageal reflux disease), High cholesterol, Hypertension, PVD (peripheral vascular disease), and Seasonal allergies. arrives in ER with c/o Chest Pain (Pt ambulated with steady gait to room 7 with c/o chest pain that started around 0130 this morning, pt now has mouth & bilateral arm numbness/tingling. Pt states that the arm tingling has been happening for the past week. Pt is worried that he is having a heart attack.)      Reviewed Nurses Note. Reviewed Vital Signs.     Reviewed Pertinent old records, History and updated as necessary.    Vitals:    03/14/24 1136   BP: 125/78   Pulse: 80   Resp: 20   Temp: 97.1 °F (36.2 °C)        Medical Decision Making    Marko Heard is 64 y.o. male who  has a past medical history of Anxiety disorder, unspecified, Cross esophagus, COPD (chronic obstructive pulmonary disease), DVT (deep venous thrombosis), Frequent urination, GERD (gastroesophageal reflux disease), High cholesterol, Hypertension, PVD (peripheral vascular disease), and Seasonal allergies. arrives in ER with c/o No chief complaint on file.    According to patient he started having chest pain last night, it is mainly in the left side, he is also having some heartburn, then this morning started having some numbness in the mouth and bilateral upper and lower extremities, he says he is taking his anxiety medicine but he does not know what he is taking for it, so he decided to come to the emergency room.  Patient says he is having chest pain and he thinks he has having a stroke.  On arrival patient's NIH is 0, he walked in in his stable gait, does not have any neurologic deficit.  He appears to be anxious, is jittery, has been some tremor, and just feels numb all over.    EKG on arrival does not show any acute MI, I  will do a detailed cardiac workup on him, since he does not have any neurologic deficit as, his NIH is 0, he is already on Plavix and Xarelto and I do not think that he is really having a stroke he feels that he has having a stroke because he is numb all over which I feel is more from his anxiety.  He says his arms have been numb for a week or 2, his mouth started today.    Amount and/or Complexity of Data Reviewed  Labs: ordered.  Radiology: ordered and independent interpretation performed.    Risk  OTC drugs.  Prescription drug management.         Additional MDM:     NIH Stroke Scale:   Interval = baseline (upon arrival/admit)  Level of consciousness = 0 - alert  LOC questions = 0 - answers both correctly  LOC commands = 0 - performs both correctly  Best gaze = 0 - normal  Visual = 0 - no visual loss  Facial palsy = 0 - normal  Motor left arm =  0 - no drift  Motor right arm =  0 - no drift  Motor left leg = 0 - no drift  Motor right leg =  0 - no drift  Limb ataxia = 0 - absent  Sensory = 0 - normal  Best language = 0 - no aphasia  Dysarthria = 0 - normal articulation  Extinction and inattention = 0 - no neglect  NIH Stroke Scale Total = 0          ED Course as of 03/14/24 1231   Thu Mar 14, 2024   1215 Patient's workup in the emergency room is essentially negative for any acute problem, he has problem with anxiety, he says he is taking his anxiety medicines, I am going to give him a dose of Xanax, will advise him to drink a couple of extra glasses of water a day for a mild dehydration he has and let him go back home.  I will advise him to see his family doctor to adjust the medicines for his anxiety. [GQ]   1228 Patient's chest pain is better, he has also not as anxious as he was when he came in, I have advised him that the he should go back to his cardiologist who he saw 8 days back who did a stress test on him, and he was told to follow up in a couple of weeks, I advised him that he should call his doctor and  let him know that he was having chest pain, also he should talk to his family doctor about adjusting the medicine for his anxiety and go from there.  I advised him to drink a couple of bottles of Gatorade 0, and see family doctor. [GQ]      ED Course User Index  [GQ] Nicole Rothman MD            PROCEDURES PERFORMED IN ED:  Procedures    DIAGNOSTIC IMPRESSION:        ICD-10-CM ICD-9-CM   1. Anxiety  F41.9 300.00   2. Chest pain  R07.9 786.50               Medication List        ASK your doctor about these medications      amitriptyline 25 MG tablet  Commonly known as: ELAVIL     amLODIPine 5 MG tablet  Commonly known as: NORVASC     clopidogreL 75 mg tablet  Commonly known as: PLAVIX  Take 1 tablet (75 mg total) by mouth once daily. for 90 doses     dicyclomine 20 mg tablet  Commonly known as: BENTYL     ezetimibe 10 mg tablet  Commonly known as: ZETIA     gabapentin 300 MG capsule  Commonly known as: NEURONTIN  Take 1 capsule (300 mg total) by mouth 3 (three) times daily. for 14 days     LINZESS 145 mcg Cap capsule  Generic drug: linaCLOtide     lisinopriL 40 MG tablet  Commonly known as: PRINIVIL,ZESTRIL     metoprolol succinate 50 MG 24 hr tablet  Commonly known as: TOPROL-XL     pantoprazole 40 MG tablet  Commonly known as: PROTONIX     XARELTO 20 mg Tab  Generic drug: rivaroxaban  Take 1 tablet (20 mg total) by mouth every morning. Stopped 5/20/23                Follow-up Information       Mildred Silverio MD In 1 day.    Specialty: Family Medicine  Contact information:  [address]  [address]  [facility] [address]  [phone]               Ayush Pinedo MD.    Specialty: Cardiology  Contact information:  [address]  [address]  [facility] [address]  [phone]                                      Nicole Rothman MD  03/14/24 6098

## 2024-03-27 DIAGNOSIS — M54.2 NECKACHE: Primary | ICD-10-CM

## 2024-04-03 ENCOUNTER — HOSPITAL ENCOUNTER (OUTPATIENT)
Dept: RADIOLOGY | Facility: HOSPITAL | Age: 64
Discharge: HOME | End: 2024-04-03
Attending: FAMILY MEDICINE
Payer: MEDICAID

## 2024-04-03 DIAGNOSIS — M54.2 NECKACHE: ICD-10-CM

## 2024-04-03 PROCEDURE — 72141 MRI NECK SPINE W/O DYE: CPT | Mod: TC

## 2024-04-04 ENCOUNTER — HOSPITAL ENCOUNTER (INPATIENT)
Facility: HOSPITAL | Age: 64
LOS: 1 days | Discharge: ANOTHER HOSPITAL | DRG: 552 | End: 2024-04-05
Attending: EMERGENCY MEDICINE | Admitting: INTERNAL MEDICINE
Payer: MEDICAID

## 2024-04-04 DIAGNOSIS — M54.12 CERVICAL RADICULOPATHY: ICD-10-CM

## 2024-04-04 DIAGNOSIS — G95.20 SPINAL CORD COMPRESSION: Primary | ICD-10-CM

## 2024-04-04 LAB
ALBUMIN SERPL-MCNC: 3.9 G/DL (ref 3.4–4.8)
ALBUMIN/GLOB SERPL: 1 RATIO (ref 1.1–2)
ALP SERPL-CCNC: 79 UNIT/L (ref 40–150)
ALT SERPL-CCNC: 37 UNIT/L (ref 0–55)
APPEARANCE UR: CLEAR
APTT PPP: 33.6 SECONDS (ref 23.4–33.9)
AST SERPL-CCNC: 33 UNIT/L (ref 5–34)
BASOPHILS # BLD AUTO: 0.04 X10(3)/MCL
BASOPHILS NFR BLD AUTO: 1.1 %
BILIRUB SERPL-MCNC: 0.7 MG/DL
BILIRUB UR QL STRIP.AUTO: NEGATIVE
BUN SERPL-MCNC: 13.8 MG/DL (ref 8.4–25.7)
CALCIUM SERPL-MCNC: 9.4 MG/DL (ref 8.8–10)
CHLORIDE SERPL-SCNC: 108 MMOL/L (ref 98–107)
CO2 SERPL-SCNC: 23 MMOL/L (ref 23–31)
COLOR UR AUTO: NORMAL
CREAT SERPL-MCNC: 1.21 MG/DL (ref 0.73–1.18)
EOSINOPHIL # BLD AUTO: 0.38 X10(3)/MCL (ref 0–0.9)
EOSINOPHIL NFR BLD AUTO: 10.9 %
ERYTHROCYTE [DISTWIDTH] IN BLOOD BY AUTOMATED COUNT: 12.6 % (ref 11.5–17)
GFR SERPLBLD CREATININE-BSD FMLA CKD-EPI: >60 MLS/MIN/1.73/M2
GLOBULIN SER-MCNC: 4.1 GM/DL (ref 2.4–3.5)
GLUCOSE SERPL-MCNC: 96 MG/DL (ref 82–115)
GLUCOSE UR QL STRIP.AUTO: NEGATIVE
HCT VFR BLD AUTO: 49.1 % (ref 42–52)
HGB BLD-MCNC: 16.1 G/DL (ref 14–18)
IMM GRANULOCYTES # BLD AUTO: 0.01 X10(3)/MCL (ref 0–0.04)
IMM GRANULOCYTES NFR BLD AUTO: 0.3 %
INR PPP: 1.2 (ref 2–3)
KETONES UR QL STRIP.AUTO: NEGATIVE
LEUKOCYTE ESTERASE UR QL STRIP.AUTO: NEGATIVE
LYMPHOCYTES # BLD AUTO: 1.28 X10(3)/MCL (ref 0.6–4.6)
LYMPHOCYTES NFR BLD AUTO: 36.6 %
MCH RBC QN AUTO: 30.8 PG (ref 27–31)
MCHC RBC AUTO-ENTMCNC: 32.8 G/DL (ref 33–36)
MCV RBC AUTO: 94.1 FL (ref 80–94)
MONOCYTES # BLD AUTO: 0.44 X10(3)/MCL (ref 0.1–1.3)
MONOCYTES NFR BLD AUTO: 12.6 %
NEUTROPHILS # BLD AUTO: 1.35 X10(3)/MCL (ref 2.1–9.2)
NEUTROPHILS NFR BLD AUTO: 38.5 %
NITRITE UR QL STRIP.AUTO: NEGATIVE
NRBC BLD AUTO-RTO: 0 %
PH UR STRIP.AUTO: 6 [PH]
PLATELET # BLD AUTO: 273 X10(3)/MCL (ref 130–400)
PMV BLD AUTO: 9.2 FL (ref 7.4–10.4)
POCT GLUCOSE: 107 MG/DL (ref 70–110)
POTASSIUM SERPL-SCNC: 5 MMOL/L (ref 3.5–5.1)
PROT SERPL-MCNC: 8 GM/DL (ref 5.8–7.6)
PROT UR QL STRIP.AUTO: NEGATIVE
PROTHROMBIN TIME: 15 SECONDS (ref 11.7–14.5)
RBC # BLD AUTO: 5.22 X10(6)/MCL (ref 4.7–6.1)
RBC UR QL AUTO: NEGATIVE
SODIUM SERPL-SCNC: 138 MMOL/L (ref 136–145)
SP GR UR STRIP.AUTO: 1.01 (ref 1–1.03)
UROBILINOGEN UR STRIP-ACNC: 0.2
WBC # SPEC AUTO: 3.5 X10(3)/MCL (ref 4.5–11.5)

## 2024-04-04 PROCEDURE — 93010 ELECTROCARDIOGRAM REPORT: CPT | Mod: ,,, | Performed by: INTERNAL MEDICINE

## 2024-04-04 PROCEDURE — 85610 PROTHROMBIN TIME: CPT | Performed by: EMERGENCY MEDICINE

## 2024-04-04 PROCEDURE — 25000003 PHARM REV CODE 250: Performed by: INTERNAL MEDICINE

## 2024-04-04 PROCEDURE — 93005 ELECTROCARDIOGRAM TRACING: CPT

## 2024-04-04 PROCEDURE — 25000003 PHARM REV CODE 250: Performed by: EMERGENCY MEDICINE

## 2024-04-04 PROCEDURE — 96374 THER/PROPH/DIAG INJ IV PUSH: CPT

## 2024-04-04 PROCEDURE — 11000001 HC ACUTE MED/SURG PRIVATE ROOM

## 2024-04-04 PROCEDURE — 85025 COMPLETE CBC W/AUTO DIFF WBC: CPT | Performed by: EMERGENCY MEDICINE

## 2024-04-04 PROCEDURE — 63600175 PHARM REV CODE 636 W HCPCS: Performed by: INTERNAL MEDICINE

## 2024-04-04 PROCEDURE — 63600175 PHARM REV CODE 636 W HCPCS: Mod: JZ,JG | Performed by: EMERGENCY MEDICINE

## 2024-04-04 PROCEDURE — 80053 COMPREHEN METABOLIC PANEL: CPT | Performed by: EMERGENCY MEDICINE

## 2024-04-04 PROCEDURE — 85730 THROMBOPLASTIN TIME PARTIAL: CPT | Performed by: EMERGENCY MEDICINE

## 2024-04-04 PROCEDURE — 96375 TX/PRO/DX INJ NEW DRUG ADDON: CPT

## 2024-04-04 PROCEDURE — 81003 URINALYSIS AUTO W/O SCOPE: CPT | Performed by: EMERGENCY MEDICINE

## 2024-04-04 PROCEDURE — 99285 EMERGENCY DEPT VISIT HI MDM: CPT | Mod: 25

## 2024-04-04 PROCEDURE — S5010 5% DEXTROSE AND 0.45% SALINE: HCPCS | Performed by: EMERGENCY MEDICINE

## 2024-04-04 RX ORDER — ALUMINUM HYDROXIDE, MAGNESIUM HYDROXIDE, AND SIMETHICONE 1200; 120; 1200 MG/30ML; MG/30ML; MG/30ML
30 SUSPENSION ORAL
Status: DISCONTINUED | OUTPATIENT
Start: 2024-04-04 | End: 2024-04-05 | Stop reason: HOSPADM

## 2024-04-04 RX ORDER — SODIUM CHLORIDE 0.9 % (FLUSH) 0.9 %
10 SYRINGE (ML) INJECTION
Status: DISCONTINUED | OUTPATIENT
Start: 2024-04-04 | End: 2024-04-05 | Stop reason: HOSPADM

## 2024-04-04 RX ORDER — TRAZODONE HYDROCHLORIDE 50 MG/1
50 TABLET ORAL NIGHTLY
Status: DISCONTINUED | OUTPATIENT
Start: 2024-04-04 | End: 2024-04-05 | Stop reason: HOSPADM

## 2024-04-04 RX ORDER — MORPHINE SULFATE 4 MG/ML
4 INJECTION, SOLUTION INTRAMUSCULAR; INTRAVENOUS EVERY 4 HOURS PRN
Status: DISCONTINUED | OUTPATIENT
Start: 2024-04-04 | End: 2024-04-05 | Stop reason: HOSPADM

## 2024-04-04 RX ORDER — TALC
6 POWDER (GRAM) TOPICAL NIGHTLY PRN
Status: DISCONTINUED | OUTPATIENT
Start: 2024-04-04 | End: 2024-04-05 | Stop reason: HOSPADM

## 2024-04-04 RX ORDER — ONDANSETRON HYDROCHLORIDE 2 MG/ML
4 INJECTION, SOLUTION INTRAVENOUS EVERY 8 HOURS PRN
Status: DISCONTINUED | OUTPATIENT
Start: 2024-04-04 | End: 2024-04-05 | Stop reason: HOSPADM

## 2024-04-04 RX ORDER — AMLODIPINE BESYLATE 5 MG/1
5 TABLET ORAL NIGHTLY
Status: DISCONTINUED | OUTPATIENT
Start: 2024-04-04 | End: 2024-04-05 | Stop reason: HOSPADM

## 2024-04-04 RX ORDER — MORPHINE SULFATE 4 MG/ML
4 INJECTION, SOLUTION INTRAMUSCULAR; INTRAVENOUS
Status: COMPLETED | OUTPATIENT
Start: 2024-04-04 | End: 2024-04-04

## 2024-04-04 RX ORDER — DICYCLOMINE HYDROCHLORIDE 10 MG/1
20 CAPSULE ORAL EVERY 8 HOURS PRN
Status: DISCONTINUED | OUTPATIENT
Start: 2024-04-04 | End: 2024-04-05 | Stop reason: HOSPADM

## 2024-04-04 RX ORDER — TRAZODONE HYDROCHLORIDE 50 MG/1
50 TABLET ORAL NIGHTLY
COMMUNITY
Start: 2024-03-27

## 2024-04-04 RX ORDER — AMITRIPTYLINE HYDROCHLORIDE 25 MG/1
25 TABLET, FILM COATED ORAL NIGHTLY
Status: DISCONTINUED | OUTPATIENT
Start: 2024-04-04 | End: 2024-04-05 | Stop reason: HOSPADM

## 2024-04-04 RX ORDER — PHENAZOPYRIDINE HYDROCHLORIDE 200 MG/1
200 TABLET, FILM COATED ORAL
Status: ON HOLD | COMMUNITY
Start: 2024-03-26 | End: 2024-04-05 | Stop reason: HOSPADM

## 2024-04-04 RX ORDER — ISOSORBIDE MONONITRATE 30 MG/1
30 TABLET, EXTENDED RELEASE ORAL DAILY
COMMUNITY
Start: 2024-03-27

## 2024-04-04 RX ORDER — LISINOPRIL 20 MG/1
40 TABLET ORAL EVERY MORNING
Status: DISCONTINUED | OUTPATIENT
Start: 2024-04-05 | End: 2024-04-05 | Stop reason: HOSPADM

## 2024-04-04 RX ORDER — PANTOPRAZOLE SODIUM 40 MG/1
40 TABLET, DELAYED RELEASE ORAL EVERY MORNING
Status: DISCONTINUED | OUTPATIENT
Start: 2024-04-05 | End: 2024-04-05 | Stop reason: HOSPADM

## 2024-04-04 RX ORDER — EZETIMIBE 10 MG/1
10 TABLET ORAL NIGHTLY
Status: DISCONTINUED | OUTPATIENT
Start: 2024-04-04 | End: 2024-04-05 | Stop reason: HOSPADM

## 2024-04-04 RX ORDER — ONDANSETRON HYDROCHLORIDE 2 MG/ML
4 INJECTION, SOLUTION INTRAVENOUS
Status: COMPLETED | OUTPATIENT
Start: 2024-04-04 | End: 2024-04-04

## 2024-04-04 RX ORDER — PSYLLIUM HUSK 3.4 G/12G
1 POWDER ORAL DAILY
COMMUNITY

## 2024-04-04 RX ORDER — ISOSORBIDE MONONITRATE 30 MG/1
30 TABLET, EXTENDED RELEASE ORAL DAILY
Status: DISCONTINUED | OUTPATIENT
Start: 2024-04-04 | End: 2024-04-05 | Stop reason: HOSPADM

## 2024-04-04 RX ORDER — DEXTROSE MONOHYDRATE AND SODIUM CHLORIDE 5; .45 G/100ML; G/100ML
1000 INJECTION, SOLUTION INTRAVENOUS
Status: COMPLETED | OUTPATIENT
Start: 2024-04-04 | End: 2024-04-04

## 2024-04-04 RX ADMIN — TRAZODONE HYDROCHLORIDE 50 MG: 50 TABLET ORAL at 08:04

## 2024-04-04 RX ADMIN — ALUMINUM HYDROXIDE, MAGNESIUM HYDROXIDE, AND DIMETHICONE 30 ML: 200; 20; 200 SUSPENSION ORAL at 08:04

## 2024-04-04 RX ADMIN — ALUMINUM HYDROXIDE, MAGNESIUM HYDROXIDE, AND DIMETHICONE 30 ML: 200; 20; 200 SUSPENSION ORAL at 03:04

## 2024-04-04 RX ADMIN — EZETIMIBE 10 MG: 10 TABLET ORAL at 08:04

## 2024-04-04 RX ADMIN — ONDANSETRON 4 MG: 2 INJECTION INTRAMUSCULAR; INTRAVENOUS at 12:04

## 2024-04-04 RX ADMIN — AMITRIPTYLINE HYDROCHLORIDE 25 MG: 25 TABLET, FILM COATED ORAL at 08:04

## 2024-04-04 RX ADMIN — AMLODIPINE BESYLATE 5 MG: 5 TABLET ORAL at 08:04

## 2024-04-04 RX ADMIN — DEXTROSE AND SODIUM CHLORIDE 1000 ML: 5; 450 INJECTION, SOLUTION INTRAVENOUS at 02:04

## 2024-04-04 RX ADMIN — MORPHINE SULFATE 4 MG: 4 INJECTION, SOLUTION INTRAMUSCULAR; INTRAVENOUS at 05:04

## 2024-04-04 RX ADMIN — ONDANSETRON 4 MG: 2 INJECTION INTRAMUSCULAR; INTRAVENOUS at 06:04

## 2024-04-04 RX ADMIN — ISOSORBIDE MONONITRATE 30 MG: 30 TABLET, EXTENDED RELEASE ORAL at 03:04

## 2024-04-04 RX ADMIN — MORPHINE SULFATE 4 MG: 4 INJECTION, SOLUTION INTRAMUSCULAR; INTRAVENOUS at 12:04

## 2024-04-04 NOTE — CONSULTS
Hospital Medicine Consultation Note        Patient Name: Marko Heard  MRN: [id number]  Patient Class: IP- Inpatient   Admission Date: 4/4/2024 10:32 AM  Length of Stay: 0  Attending Physician: @CT@   Primary Care Provider: Mildred Silverio MD  Face-to-Face encounter date: 04/04/2024 g  Consulting Physician: Saeid Iyer MD  Reason for Consult: arm numbness  Chief Complaint: Neck Pain (Reports of neck pain that started 6 months ago with increasingly worse decreased sensation down his right arm. Patient had an MRI done yesterday in [place] and was told to come to the ER by his pcp for a neurology consult. MRI findings critical result. Patient denies loss of bladder/bowel continence, denies any increase weakness in any extremities.)        Patient information was obtained from patient, patient's family, past medical records.    HISTORY OF PRESENT ILLNESS:   Marko Heard is a 64 y.o. male with  has a past medical history of Anxiety disorder, unspecified, Cross esophagus, COPD (chronic obstructive pulmonary disease), DVT (deep venous thrombosis), Frequent urination, GERD (gastroesophageal reflux disease), High cholesterol, Hypertension, PVD (peripheral vascular disease), and Seasonal allergies..admitted under Dr. Mendez on 4/4/2024 with the diagnosis of cervical cord compression.  Pt has history of neck/back injury 20 yrs ago after falling off a roof.  He has had chronic pain since but is manageable and not worsened.  He developed numbness in fingers into arm and eventually radiating into neck about 2 months ago with no associated weakness.  Pcp ordered mri yesterday and pt was called by pcp today to report to ED after significant finding on mri.  Hospital Medicine team was consulted for medical management     PAST MEDICAL HISTORY:     Past Medical History:   Diagnosis Date    Anxiety disorder, unspecified     Cross esophagus     COPD (chronic obstructive pulmonary disease)     DVT (deep venous  thrombosis)     Right femoral vein    Frequent urination     GERD (gastroesophageal reflux disease)     High cholesterol     Hypertension     PVD (peripheral vascular disease)     Seasonal allergies        PAST SURGICAL HISTORY:     Past Surgical History:   Procedure Laterality Date    EGD, WITH CLOSED BIOPSY N/A 2023    Procedure: EGD, WITH CLOSED BIOPSY;  Surgeon: Michele Willson III, MD;  Location: [facility];  Service: Endoscopy;  Laterality: N/A;  A.) Bx of antrum for h-pylori  B.) Bx of antrum pathology analysis  C.) Bx of angularis    ESOPHAGOGASTRODUODENOSCOPY N/A 2023    Procedure: EGD (ESOPHAGOGASTRODUODENOSCOPY);  Surgeon: Michele Willson III, MD;  Location: [facility];  Service: Endoscopy;  Laterality: N/A;  Moderate Gastritis    INTRAVASCULAR ULTRASOUND, NON-CORONARY  10/11/2022    Procedure: Intravascular Ultrasound, Non-Coronary;  Surgeon: Timbo Clark MD;  Location: [facility] CATH LAB;  Service: Cardiology;;       ALLERGIES:   Patient has no known allergies.    FAMILY HISTORY:     Family History   Problem Relation Age of Onset    Hypertension Mother     Diabetes Mother     Diabetes Father     Hypertension Father         SOCIAL HISTORY:     Social History     Tobacco Use    Smoking status: Former     Current packs/day: 0.00     Types: Cigarettes     Quit date:      Years since quittin.2    Smokeless tobacco: Never   Substance Use Topics    Alcohol use: Yes     Comment: Occasionally        HOME MEDICATIONS:     Prior to Admission medications    Medication Sig Start Date End Date Taking? Authorizing Provider   amitriptyline (ELAVIL) 25 MG tablet Take 25 mg by mouth every evening.   Yes Provider, Historical   amLODIPine (NORVASC) 5 MG tablet Take 5 mg by mouth every evening. 10/3/22  Yes Provider, Historical   dicyclomine (BENTYL) 20 mg tablet Take 20 mg by mouth every 8 (eight) hours as needed.   Yes Provider, Historical   ezetimibe (ZETIA) 10 mg tablet Take 10 mg by mouth every evening.  10/4/22  Yes Provider, Historical   isosorbide mononitrate (IMDUR) 30 MG 24 hr tablet Take 30 mg by mouth once daily. 3/27/24  Yes Provider, Historical   LINZESS 145 mcg Cap capsule Take 145 mcg by mouth daily as needed. 10/3/22  Yes Provider, Historical   lisinopriL (PRINIVIL,ZESTRIL) 40 MG tablet Take 40 mg by mouth every morning. 9/26/22  Yes Provider, Historical   metoprolol succinate (TOPROL-XL) 50 MG 24 hr tablet Take 50 mg by mouth 2 (two) times daily. 7/8/22  Yes Provider, Historical   pantoprazole (PROTONIX) 40 MG tablet Take 40 mg by mouth every morning. 6/6/22  Yes Provider, Historical   phenazopyridine (PYRIDIUM) 200 MG tablet Take 200 mg by mouth 3 (three) times daily with meals. 3/26/24  Yes Provider, Historical   psyllium husk, with sugar, (METAMUCIL, WITH SUGAR,) 3.4 gram packet Take 1 packet by mouth once daily.   Yes Provider, Historical   traZODone (DESYREL) 50 MG tablet Take 50 mg by mouth every evening. 3/27/24  Yes Provider, Historical   XARELTO 20 mg Tab Take 1 tablet (20 mg total) by mouth every morning. Stopped 5/20/23 5/27/23  Yes Michele Willson III, MD   clopidogreL (PLAVIX) 75 mg tablet Take 1 tablet (75 mg total) by mouth once daily. for 90 doses 5/27/23 8/25/23  Michele Willson III, MD   gabapentin (NEURONTIN) 300 MG capsule Take 1 capsule (300 mg total) by mouth 3 (three) times daily. for 14 days 3/6/24 3/20/24  Ashu Lopez MD   cetirizine (ZYRTEC) 10 MG tablet Take 10 mg by mouth every morning. 4/14/23 5/2/23  Provider, Historical   furosemide (LASIX) 20 MG tablet Take 1 tablet by mouth daily as needed. 4/12/23 5/2/23  Provider, Historical   tamsulosin (FLOMAX) 0.4 mg Cap Take 1 capsule by mouth every morning. 4/12/23 5/2/23  Provider, Historical       REVIEW OF SYSTEMS:   Review of Systems   Cardiovascular:  Positive for claudication.   Gastrointestinal:         Chronic right flank pain radiating to penis with burning.  Seen by urology recently and being worked  up.   Genitourinary:  Positive for dysuria and penile pain.   Musculoskeletal:  Positive for back pain and neck pain.        PHYSICAL EXAM:   /86   Pulse 62   Temp 97.7 °F (36.5 °C) (Oral)   Resp 18   Ht 6' (1.829 m)   Wt 101.6 kg (224 lb)   SpO2 95%   BMI 30.38 kg/m²     Physical Exam  Vitals reviewed.   HENT:      Head: Normocephalic and atraumatic.   Eyes:      Extraocular Movements: Extraocular movements intact.      Pupils: Pupils are equal, round, and reactive to light.   Pulmonary:      Breath sounds: Normal breath sounds. No wheezing or rales.   Abdominal:      General: Bowel sounds are normal.      Palpations: Abdomen is soft.      Tenderness: There is abdominal tenderness (right flank/abd).   Musculoskeletal:      Cervical back: Neck supple.   Neurological:      Mental Status: He is alert.      Sensory: Sensory deficit (right arm) present.          LABS AND IMAGING:     Admission on 04/04/2024   Component Date Value Ref Range Status    PTT 04/04/2024 33.6  23.4 - 33.9 seconds Final    For Minimal Heparin Infusion, the goal aPTT 64-85 seconds corresponds to an anti-Xa of 0.3-0.5.    For Low Intensity and High Intensity Heparin, the goal aPTT  seconds corresponds to an anti-Xa of 0.3-0.7    Sodium Level 04/04/2024 138  136 - 145 mmol/L Final    Potassium Level 04/04/2024 5.0  3.5 - 5.1 mmol/L Final    Chloride 04/04/2024 108 (H)  98 - 107 mmol/L Final    Carbon Dioxide 04/04/2024 23  23 - 31 mmol/L Final    Glucose Level 04/04/2024 96  82 - 115 mg/dL Final    Blood Urea Nitrogen 04/04/2024 13.8  8.4 - 25.7 mg/dL Final    Creatinine 04/04/2024 1.21 (H)  0.73 - 1.18 mg/dL Final    Calcium Level Total 04/04/2024 9.4  8.8 - 10.0 mg/dL Final    Protein Total 04/04/2024 8.0 (H)  5.8 - 7.6 gm/dL Final    Albumin Level 04/04/2024 3.9  3.4 - 4.8 g/dL Final    Globulin 04/04/2024 4.1 (H)  2.4 - 3.5 gm/dL Final    Albumin/Globulin Ratio 04/04/2024 1.0 (L)  1.1 - 2.0 ratio Final    Bilirubin Total  04/04/2024 0.7  <=1.5 mg/dL Final    Alkaline Phosphatase 04/04/2024 79  40 - 150 unit/L Final    Alanine Aminotransferase 04/04/2024 37  0 - 55 unit/L Final    Aspartate Aminotransferase 04/04/2024 33  5 - 34 unit/L Final    eGFR 04/04/2024 >60  mls/min/1.73/m2 Final    PT 04/04/2024 15.0 (H)  11.7 - 14.5 seconds Final    INR 04/04/2024 1.2 (L)  2.0 - 3.0 Final    Color, UA 04/04/2024 Straw  Yellow, Light-Yellow, Dark Yellow, Carol, Straw Final    Appearance, UA 04/04/2024 Clear  Clear Final    Specific Gravity, UA 04/04/2024 1.015  1.005 - 1.030 Final    pH, UA 04/04/2024 6.0  5.0 - 8.5 Final    Protein, UA 04/04/2024 Negative  Negative Final    Glucose, UA 04/04/2024 Negative  Negative, Normal Final    Ketones, UA 04/04/2024 Negative  Negative Final    Blood, UA 04/04/2024 Negative  Negative Final    Bilirubin, UA 04/04/2024 Negative  Negative Final    Urobilinogen, UA 04/04/2024 0.2  0.2, 1.0, Normal Final    Nitrites, UA 04/04/2024 Negative  Negative Final    Leukocyte Esterase, UA 04/04/2024 Negative  Negative Final    WBC 04/04/2024 3.50 (L)  4.50 - 11.50 x10(3)/mcL Final    RBC 04/04/2024 5.22  4.70 - 6.10 x10(6)/mcL Final    Hgb 04/04/2024 16.1  14.0 - 18.0 g/dL Final    Hct 04/04/2024 49.1  42.0 - 52.0 % Final    MCV 04/04/2024 94.1 (H)  80.0 - 94.0 fL Final    MCH 04/04/2024 30.8  27.0 - 31.0 pg Final    MCHC 04/04/2024 32.8 (L)  33.0 - 36.0 g/dL Final    RDW 04/04/2024 12.6  11.5 - 17.0 % Final    Platelet 04/04/2024 273  130 - 400 x10(3)/mcL Final    MPV 04/04/2024 9.2  7.4 - 10.4 fL Final    Neut % 04/04/2024 38.5  % Final    Lymph % 04/04/2024 36.6  % Final    Mono % 04/04/2024 12.6  % Final    Eos % 04/04/2024 10.9  % Final    Basophil % 04/04/2024 1.1  % Final    Lymph # 04/04/2024 1.28  0.6 - 4.6 x10(3)/mcL Final    Neut # 04/04/2024 1.35 (L)  2.1 - 9.2 x10(3)/mcL Final    Mono # 04/04/2024 0.44  0.1 - 1.3 x10(3)/mcL Final    Eos # 04/04/2024 0.38  0 - 0.9 x10(3)/mcL Final    Baso # 04/04/2024 0.04   <=0.2 x10(3)/mcL Final    IG# 04/04/2024 0.01  0 - 0.04 x10(3)/mcL Final    IG% 04/04/2024 0.3  % Final    NRBC% 04/04/2024 0.0  % Final        MRI Cervical Spine Without Contrast    Result Date: 4/3/2024  EXAMINATION: MRI CERVICAL SPINE WITHOUT CONTRAST: CLINICAL HISTORY: Cervicalgia, m54.2; COMPARISON: None available FINDINGS: Serial axial and sagittal 1.5 Jill images obtained using T1 and T2 weighted techniques without the administration of intravenous contrast. Vertebral body height is grossly intact.  There is slight retrolisthesis of a C5 on C6..  Cerebellar tonsils extend caudally to the level of foramen magnum.  The cervical cord is grossly normal in size.  There is thinning/mass effect on the cervical cord at the level of C5-6 with focal increased T2 signal intensity.  Disc space narrowing, degenerative disc changes, osteophyte formation, and facet arthrosis identified.  No abnormal paraspinal mass is identified without the administration of IV contrast.  There is mild motion artifact.  Bone marrow signal intensity is heterogeneous throughout. C2-3: No significant central spinal or neural foraminal stenosis.  Mild posterior disc bulge.  Mild facet arthrosis. C3-4: Moderate encroachment into the central spinal canal and neural foramina bilaterally secondary to posterior disc bulge, posterior osteophyte formation, and facet arthrosis.  There is mass effect on the cervical cord. C4-5: Mild to moderate encroachment into the central spinal canal and neural foramina bilaterally secondary to mild posterior disc bulge, posterior osteophyte formation, and facet arthrosis C5-6: Severe encroachment into the central spinal canal and moderate moderate to severe encroachment into the neural foramina bilaterally secondary to posterior disc bulge, posterior osteophyte formation, slight retrolisthesis of C5 on C6, and facet arthrosis. There is marked mass effect on the cervical cord. C6-7: Severe encroachment into the  central spinal canal and moderate to severe encroachment into the neural foramina bilaterally secondary to posterior disc bulge, posterior osteophyte formation, and facet arthrosis.  There is marked mass effect on the cervical cord. C7-T1: Moderate encroachment into the central spinal canal and neural foramina bilaterally secondary to posterior disc bulge, posterior osteophyte formation, and facet arthrosis.  There is mass effect on the cervical cord.     1. Multilevel moderate to severe encroachment into the central spinal canal and neural foramina as described; most severe at C5-6 and C6-7.  There is marked mass effect on the cervical cord at these levels with abnormal increased T2 signal intensity at C5-6 suggesting cord contusion/edema and or myelomalacia. 2. Moderate to advanced cervical spondylosis 3. Slight retrolisthesis of C5 on C6 4. The covering clinician was notified of the findings on the 04/03/2024 at 15:30. Electronically signed by: Phil eDvine Date:    04/03/2024 Time:    15:24    X-ray Chest AP Portable    Result Date: 3/14/2024  EXAMINATION: XR CHEST AP PORTABLE CLINICAL HISTORY: Chest Pain;, . COMPARISON: 06/27/2023 FINDINGS: An AP view or more reveals the heart to be normal in size.  The trachea is midline.  No definite infiltrate or effusion is seen.  There is mild hyper aeration of the right upper lung.  Bony structures appear grossly intact.     1. No active cardiopulmonary disease identified Electronically signed by: Phil Devine Date:    03/14/2024 Time:    12:08    X-Ray Shoulder Trauma Right    Result Date: 3/6/2024  EXAMINATION: XR SHOULDER TRAUMA 3 VIEW RIGHT CLINICAL HISTORY: Pain, unspecified; . COMPARISON: None available. FINDINGS: Internal rotation, external rotation, and transscapular Y-views revealno definite fracture or dislocation.  Joint spaces appear grossly intact.  No aggressive osteolytic or osteoblastic lesion is seen.     1. No acute osseous defect identified 2. MR  examination would allow further evaluation if clinically indicated Electronically signed by: Phil Devine Date:    03/06/2024 Time:    13:48    X-Ray Cervical Spine AP And Lateral    Result Date: 3/6/2024  EXAMINATION: XR CERVICAL SPINE AP LATERAL CLINICAL HISTORY: , Pain, unspecified. COMPARISON: None available. FINDINGS: AP, lateral, and odontoid views reveal vertebral body height and alignment are grossly intact.  There is slight retrolisthesis of C5 on C6.  C7-T1 is suboptimally visualized on lateral views.  There is reversal of the normal lordotic curve with the apex at C5.  Disc space narrowing, osteophyte formation, and facet arthrosis are identified.  Prevertebral soft tissues are within normal limits.  The odontoid process is intact.  There is dextro convexity of the cervical spine.     1. Slight retrolisthesis of C5 on C6 2. Reversal of the normal lordotic curve with the apex at C5 3. Cervical spondylosis 4. Dextroconvexity of the cervical spine Electronically signed by: Phil Devine Date:    03/06/2024 Time:    13:47       Microbiology Results (last 7 days)       ** No results found for the last 168 hours. **             ASSESSMENT & PLAN:   Cervical cord compression  Hx copd,dvt,htn,hld,pvd    Plan    Consult neurosx  Pain control  Home meds    DVT: Prophylaxis: SCD  GI Prophylaxis:  Protonix/     __________________________________________________________________________  INPATIENT LIST OF MEDICATIONS     Current Facility-Administered Medications:     aluminum-magnesium hydroxide-simethicone 200-200-20 mg/5 mL suspension 30 mL, 30 mL, Oral, QID (AC & HS), Saeid Iyer MD    amitriptyline tablet 25 mg, 25 mg, Oral, QHS, Saeid Iyer MD    amLODIPine tablet 5 mg, 5 mg, Oral, QHS, Saeid Iyer MD    dicyclomine capsule 20 mg, 20 mg, Oral, Q8H PRN, Saeid Iyer MD    ezetimibe tablet 10 mg, 10 mg, Oral, QHS, Saeid Iyer MD    isosorbide mononitrate 24 hr tablet 30 mg, 30 mg,  Oral, Daily, Saeid Iyer MD    linaCLOtide capsule 145 mcg, 145 mcg, Oral, Daily PRN, Saeid Iyer MD    [START ON 4/5/2024] lisinopriL tablet 40 mg, 40 mg, Oral, QAM, Saeid Iyer MD    melatonin tablet 6 mg, 6 mg, Oral, Nightly PRN, Saeid Iyer MD    morphine injection 4 mg, 4 mg, Intravenous, Q4H PRN, Saeid Iyer MD    ondansetron injection 4 mg, 4 mg, Intravenous, Q8H PRN, Saeid Iyer MD    [START ON 4/5/2024] pantoprazole EC tablet 40 mg, 40 mg, Oral, QAM, Saeid Iyer MD    sodium chloride 0.9% flush 10 mL, 10 mL, Intravenous, PRN, Saeid Iyer MD    traZODone tablet 50 mg, 50 mg, Oral, QHS, Saeid Iyer MD    Current Outpatient Medications:     amitriptyline (ELAVIL) 25 MG tablet, Take 25 mg by mouth every evening., Disp: , Rfl:     amLODIPine (NORVASC) 5 MG tablet, Take 5 mg by mouth every evening., Disp: , Rfl:     dicyclomine (BENTYL) 20 mg tablet, Take 20 mg by mouth every 8 (eight) hours as needed., Disp: , Rfl:     ezetimibe (ZETIA) 10 mg tablet, Take 10 mg by mouth every evening., Disp: , Rfl:     isosorbide mononitrate (IMDUR) 30 MG 24 hr tablet, Take 30 mg by mouth once daily., Disp: , Rfl:     LINZESS 145 mcg Cap capsule, Take 145 mcg by mouth daily as needed., Disp: , Rfl:     lisinopriL (PRINIVIL,ZESTRIL) 40 MG tablet, Take 40 mg by mouth every morning., Disp: , Rfl:     metoprolol succinate (TOPROL-XL) 50 MG 24 hr tablet, Take 50 mg by mouth 2 (two) times daily., Disp: , Rfl:     pantoprazole (PROTONIX) 40 MG tablet, Take 40 mg by mouth every morning., Disp: , Rfl:     phenazopyridine (PYRIDIUM) 200 MG tablet, Take 200 mg by mouth 3 (three) times daily with meals., Disp: , Rfl:     psyllium husk, with sugar, (METAMUCIL, WITH SUGAR,) 3.4 gram packet, Take 1 packet by mouth once daily., Disp: , Rfl:     traZODone (DESYREL) 50 MG tablet, Take 50 mg by mouth every evening., Disp: , Rfl:     XARELTO 20 mg Tab, Take 1 tablet (20 mg total) by  mouth every morning. Stopped 5/20/23, Disp: , Rfl:     clopidogreL (PLAVIX) 75 mg tablet, Take 1 tablet (75 mg total) by mouth once daily. for 90 doses, Disp: 90 tablet, Rfl: 0    gabapentin (NEURONTIN) 300 MG capsule, Take 1 capsule (300 mg total) by mouth 3 (three) times daily. for 14 days, Disp: 42 capsule, Rfl: 0    Facility-Administered Medications Ordered in Other Encounters:     diazePAM tablet 10 mg, 10 mg, Oral, On Call Procedure, Timbo Clark MD, 5 mg at 10/11/22 0831    diphenhydrAMINE capsule 50 mg, 50 mg, Oral, On Call Procedure, Timbo Clark MD, 50 mg at 10/11/22 0831      Scheduled Meds:   aluminum-magnesium hydroxide-simethicone  30 mL Oral QID (AC & HS)    amitriptyline  25 mg Oral QHS    amLODIPine  5 mg Oral QHS    ezetimibe  10 mg Oral QHS    isosorbide mononitrate  30 mg Oral Daily    [START ON 4/5/2024] lisinopriL  40 mg Oral QAM    [START ON 4/5/2024] pantoprazole  40 mg Oral QAM    traZODone  50 mg Oral QHS     Continuous Infusions:  PRN Meds:.dicyclomine, linaCLOtide, melatonin, morphine, ondansetron, sodium chloride 0.9%    ______________________________________________________________________________    Thank you for the consult, will be happy to follow alongside you      All diagnosis and differential diagnosis have been reviewed; assessment and plan has been documented; I have personally reviewed the labs and test results that are presently available; I have reviewed the patients medication list; I have reviewed the consulting providers response and recommendations. I have reviewed or attempted to review medical records based upon their availability.    All of the patient and family questions have been addressed and answered. Patient's is agreeable to the above stated plan. I will continue to monitor closely and make adjustments to medical management as needed.    Saeid Iyer MD   04/04/2024

## 2024-04-04 NOTE — ED PROVIDER NOTES
Encounter Date: 4/4/2024       History     Chief Complaint   Patient presents with    Neck Pain     Reports of neck pain that started 6 months ago with increasingly worse decreased sensation down his right arm. Patient had an MRI done yesterday in [place] and was told to come to the ER by his pcp for a neurology consult. MRI findings critical result. Patient denies loss of bladder/bowel continence, denies any increase weakness in any extremities.     64-year-old male states he was called by his primary care physician, Mildred godinez, in [place] yesterday afternoon after he had an MRI of his neck.  He was told yesterday that it was an emergency and to go to the ER in [place].  He states he did not understand why it was an emergency and did not go to the ER yesterday but decided to come today.  He states there is nothing new going on today.  He only came because he was told to come.  He states that he has been having pain in the right side of his neck that radiates down his right arm with associated paresthesias which has been worse for the past 2 months.  He denies retention or incontinence of stool/urine, he denies any recent trauma.  He states he sustained a fall and injured his neck 20 years ago, but he has not had chronic neck pain since that time until several months ago.  He denies being on plavix      Review of patient's allergies indicates:  No Known Allergies  Past Medical History:   Diagnosis Date    Anxiety disorder, unspecified     Cross esophagus     COPD (chronic obstructive pulmonary disease)     DVT (deep venous thrombosis)     Right femoral vein    Frequent urination     GERD (gastroesophageal reflux disease)     High cholesterol     Hypertension     PVD (peripheral vascular disease)     Seasonal allergies      Past Surgical History:   Procedure Laterality Date    EGD, WITH CLOSED BIOPSY N/A 5/25/2023    Procedure: EGD, WITH CLOSED BIOPSY;  Surgeon: Michele Willson III, MD;  Location: [facility];   Service: Endoscopy;  Laterality: N/A;  A.) Bx of antrum for h-pylori  B.) Bx of antrum pathology analysis  C.) Bx of angularis    ESOPHAGOGASTRODUODENOSCOPY N/A 2023    Procedure: EGD (ESOPHAGOGASTRODUODENOSCOPY);  Surgeon: Michele Willson III, MD;  Location: [facility];  Service: Endoscopy;  Laterality: N/A;  Moderate Gastritis    INTRAVASCULAR ULTRASOUND, NON-CORONARY  10/11/2022    Procedure: Intravascular Ultrasound, Non-Coronary;  Surgeon: Timbo Clark MD;  Location: [facility] CATH LAB;  Service: Cardiology;;     Family History   Problem Relation Age of Onset    Hypertension Mother     Diabetes Mother     Diabetes Father     Hypertension Father      Social History     Tobacco Use    Smoking status: Former     Current packs/day: 0.00     Types: Cigarettes     Quit date:      Years since quittin.2    Smokeless tobacco: Never   Substance Use Topics    Alcohol use: Yes     Comment: Occasionally    Drug use: Never     Review of Systems   All other systems reviewed and are negative.      Physical Exam     Initial Vitals [24 1030]   BP Pulse Resp Temp SpO2   128/86 67 20 97.7 °F (36.5 °C) 98 %      MAP       --         Physical Exam    Nursing note and vitals reviewed.  Constitutional: He appears well-developed.   HENT:   Head: Normocephalic.   Eyes: Conjunctivae are normal.   Cardiovascular:  Normal rate, regular rhythm and normal heart sounds.           Pulmonary/Chest: Breath sounds normal.   Abdominal: Abdomen is soft. Bowel sounds are normal. He exhibits no distension. There is no abdominal tenderness.   Musculoskeletal:         General: No edema.     Lymphadenopathy:     He has no cervical adenopathy.   Neurological: He is alert. A sensory deficit is present.   Patient has decreased sensation to the right upper extremity.  His  strength is 4/5 on the right and 5/5 on the left.  His strength with forearm flexion is equal bilaterally as is his strength with hip flexion and plantar flexion.  He  ambulates with a normal gait.   Skin: Skin is warm.   Psychiatric: He has a normal mood and affect.         ED Course   Procedures  Labs Reviewed   COMPREHENSIVE METABOLIC PANEL - Abnormal; Notable for the following components:       Result Value    Chloride 108 (*)     Creatinine 1.21 (*)     Protein Total 8.0 (*)     Globulin 4.1 (*)     Albumin/Globulin Ratio 1.0 (*)     All other components within normal limits   PROTIME-INR - Abnormal; Notable for the following components:    PT 15.0 (*)     INR 1.2 (*)     All other components within normal limits   CBC WITH DIFFERENTIAL - Abnormal; Notable for the following components:    WBC 3.50 (*)     MCV 94.1 (*)     MCHC 32.8 (*)     Neut # 1.35 (*)     All other components within normal limits   APTT - Normal   URINALYSIS, REFLEX TO URINE CULTURE - Normal   CBC W/ AUTO DIFFERENTIAL    Narrative:     The following orders were created for panel order CBC auto differential.  Procedure                               Abnormality         Status                     ---------                               -----------         ------                     CBC with Differential[[id number]]       Abnormal            Final result                 Please view results for these tests on the individual orders.     EKG Readings: (Independently Interpreted)   EKG time 12:07 p.m. rate 58 rhythm sinus bradycardia axis normal QRS poor R-wave progression through V3 possibly secondary to lead placement.  No ST changes.  QTC normal       Imaging Results    None          Medications   morphine injection 4 mg (4 mg Intravenous Given 4/4/24 1211)   ondansetron injection 4 mg (4 mg Intravenous Given 4/4/24 1211)     Medical Decision Making  Medical Decision Making  Problem list/ differential diagnosis including but not limited to:  Spinal cord compression, fracture, disc herniation, spinal stenosis, neoplasm     Patient's chronic illnesses impacting care:  DVT on Xarelto        Diagnostic test  considered but not ordered: none     Radiology reports  MRI of the cervical spine shows cord contusion versus edema versus myelomalacia with severe encroachment on the cord primarily at C5-6     Discussion of case with external qualified healthcare professionals:  Dr. Dumont called at 1235.  NP called back and reviewed MRI report with me as well as discussing the case/h/p/xarelto for DVT.  Recs admit to hospitalist at [facility].  No further recs.  Discussed case with [facility] NP who accepts admission to Dr. Sheldon.  No further recs.          Review of external notes( inpt, ems, NH, clinic): in [facility]        Decision rules/scores:     Medications reviewed: all  Medications ordered in the ER: morphine/zofran  Discharge prescriptions: na     Social variables possible impacting patient's healthcare: none     Code status/discussion     Shared decision making:     Consideration for admission versus discharge: discussed admission with pt      Amount and/or Complexity of Data Reviewed  Labs: ordered. Decision-making details documented in ED Course.  Radiology: MRI reviewed  EKG interpreted by me     Risk  Narcotic medications      Amount and/or Complexity of Data Reviewed  External Data Reviewed: radiology.     Details: MRI report in [facility]  Labs: ordered. Decision-making details documented in ED Course.  Radiology:  Decision-making details documented in ED Course.  ECG/medicine tests: independent interpretation performed.    Risk  Parenteral controlled substances.                                      Clinical Impression:  Final diagnoses:  [G95.20] Spinal cord compression                 Michele Mendez Jr., MD  04/04/24 130       Michele Mendez Jr., MD  04/04/24 5790

## 2024-04-05 VITALS
SYSTOLIC BLOOD PRESSURE: 113 MMHG | OXYGEN SATURATION: 91 % | DIASTOLIC BLOOD PRESSURE: 69 MMHG | HEIGHT: 72 IN | HEART RATE: 62 BPM | TEMPERATURE: 98 F | WEIGHT: 237.63 LBS | RESPIRATION RATE: 18 BRPM | BODY MASS INDEX: 32.19 KG/M2

## 2024-04-05 LAB
ALBUMIN SERPL-MCNC: 3.6 G/DL (ref 3.4–4.8)
ALBUMIN/GLOB SERPL: 1.2 RATIO (ref 1.1–2)
ALP SERPL-CCNC: 73 UNIT/L (ref 40–150)
ALT SERPL-CCNC: 31 UNIT/L (ref 0–55)
AST SERPL-CCNC: 20 UNIT/L (ref 5–34)
BASOPHILS # BLD AUTO: 0.02 X10(3)/MCL
BASOPHILS NFR BLD AUTO: 0.5 %
BILIRUB SERPL-MCNC: 0.8 MG/DL
BUN SERPL-MCNC: 13.3 MG/DL (ref 8.4–25.7)
CALCIUM SERPL-MCNC: 9.5 MG/DL (ref 8.8–10)
CHLORIDE SERPL-SCNC: 107 MMOL/L (ref 98–107)
CO2 SERPL-SCNC: 23 MMOL/L (ref 23–31)
CREAT SERPL-MCNC: 1.33 MG/DL (ref 0.73–1.18)
EOSINOPHIL # BLD AUTO: 0.19 X10(3)/MCL (ref 0–0.9)
EOSINOPHIL NFR BLD AUTO: 4.7 %
ERYTHROCYTE [DISTWIDTH] IN BLOOD BY AUTOMATED COUNT: 12.8 % (ref 11.5–17)
GFR SERPLBLD CREATININE-BSD FMLA CKD-EPI: 60 MLS/MIN/1.73/M2
GLOBULIN SER-MCNC: 2.9 GM/DL (ref 2.4–3.5)
GLUCOSE SERPL-MCNC: 158 MG/DL (ref 82–115)
HCT VFR BLD AUTO: 46.3 % (ref 42–52)
HGB BLD-MCNC: 15.1 G/DL (ref 14–18)
IMM GRANULOCYTES # BLD AUTO: 0.01 X10(3)/MCL (ref 0–0.04)
IMM GRANULOCYTES NFR BLD AUTO: 0.2 %
LYMPHOCYTES # BLD AUTO: 1 X10(3)/MCL (ref 0.6–4.6)
LYMPHOCYTES NFR BLD AUTO: 24.9 %
MCH RBC QN AUTO: 30.9 PG (ref 27–31)
MCHC RBC AUTO-ENTMCNC: 32.6 G/DL (ref 33–36)
MCV RBC AUTO: 94.7 FL (ref 80–94)
MONOCYTES # BLD AUTO: 0.37 X10(3)/MCL (ref 0.1–1.3)
MONOCYTES NFR BLD AUTO: 9.2 %
NEUTROPHILS # BLD AUTO: 2.43 X10(3)/MCL (ref 2.1–9.2)
NEUTROPHILS NFR BLD AUTO: 60.5 %
NRBC BLD AUTO-RTO: 0 %
PLATELET # BLD AUTO: 271 X10(3)/MCL (ref 130–400)
PMV BLD AUTO: 9.1 FL (ref 7.4–10.4)
POTASSIUM SERPL-SCNC: 4.3 MMOL/L (ref 3.5–5.1)
PROT SERPL-MCNC: 6.5 GM/DL (ref 5.8–7.6)
RBC # BLD AUTO: 4.89 X10(6)/MCL (ref 4.7–6.1)
SODIUM SERPL-SCNC: 137 MMOL/L (ref 136–145)
WBC # SPEC AUTO: 4.02 X10(3)/MCL (ref 4.5–11.5)

## 2024-04-05 PROCEDURE — 80053 COMPREHEN METABOLIC PANEL: CPT | Performed by: PHYSICIAN ASSISTANT

## 2024-04-05 PROCEDURE — 85025 COMPLETE CBC W/AUTO DIFF WBC: CPT | Performed by: PHYSICIAN ASSISTANT

## 2024-04-05 PROCEDURE — 25000003 PHARM REV CODE 250: Performed by: INTERNAL MEDICINE

## 2024-04-05 RX ORDER — TAMSULOSIN HYDROCHLORIDE 0.4 MG/1
0.4 CAPSULE ORAL DAILY
Qty: 30 CAPSULE | Refills: 0 | Status: SHIPPED | OUTPATIENT
Start: 2024-04-06 | End: 2024-05-06

## 2024-04-05 RX ORDER — SODIUM CHLORIDE 9 MG/ML
INJECTION, SOLUTION INTRAVENOUS CONTINUOUS
Status: DISCONTINUED | OUTPATIENT
Start: 2024-04-05 | End: 2024-04-05 | Stop reason: HOSPADM

## 2024-04-05 RX ORDER — PREDNISONE 20 MG/1
40 TABLET ORAL DAILY
Qty: 10 TABLET | Refills: 0 | Status: SHIPPED | OUTPATIENT
Start: 2024-04-05 | End: 2024-04-10

## 2024-04-05 RX ORDER — TAMSULOSIN HYDROCHLORIDE 0.4 MG/1
0.4 CAPSULE ORAL DAILY
Status: DISCONTINUED | OUTPATIENT
Start: 2024-04-05 | End: 2024-04-05 | Stop reason: HOSPADM

## 2024-04-05 RX ADMIN — ISOSORBIDE MONONITRATE 30 MG: 30 TABLET, EXTENDED RELEASE ORAL at 08:04

## 2024-04-05 RX ADMIN — LISINOPRIL 40 MG: 20 TABLET ORAL at 07:04

## 2024-04-05 RX ADMIN — ALUMINUM HYDROXIDE, MAGNESIUM HYDROXIDE, AND DIMETHICONE 30 ML: 200; 20; 200 SUSPENSION ORAL at 06:04

## 2024-04-05 RX ADMIN — PANTOPRAZOLE SODIUM 40 MG: 40 TABLET, DELAYED RELEASE ORAL at 07:04

## 2024-04-05 NOTE — H&P
[facility] Hospital Medicine History & Physical Examination       Patient Name: Marko Heard  MRN: [id number]  Patient Class: IP- Inpatient   Admission Date: 04/05/2024   Admitting Service: Hospital Medicine   Length of Stay: 1  Attending Physician: Gilda Restrepo MD   Primary Care Provider: Mildred Silverio MD  Face-to-Face encounter date: 04/05/2024  Code Status: Full code   Chief Complaint: Neck Pain (Reports of neck pain that started 6 months ago with increasingly worse decreased sensation down his right arm. Patient had an MRI done yesterday in [place] and was told to come to the ER by his pcp for a neurology consult. MRI findings critical result. Patient denies loss of bladder/bowel continence, denies any increase weakness in any extremities.)      Patient information was obtained from patient, patient's family, past medical records and ER records.      MD Addendum -  Admitted for evaluation of significant findings on MRI neck with H/O chronic neck pain with radiculopathy which has gotten worse lately hence MRI was done. MRI neck showed multilevel disease involving  central spinal canal and neural foramina most severe at C5-6 and C6-7 associated with cord compression and myelomalacia. Neurosurgery was consulted and suggested Cervical decompression on am urgent basis.         HISTORY OF PRESENT ILLNESS:   Marko Heard is a 64 y.o. male with a past medical history of hypertension, hyperlipidemia, PVD, DVT on Xarelto, GERD, COPD, Cross's esophagus, and anxiety who presented to [facility] ED on 4/4/2024 with c/o neck pain.  Patient reported worsening neck pain with paresthesias to right arm for about the past 2 months.  Patient reported history of chronic neck pain with history of previous injury 20 years ago after falling off a roof.  Patient reported recent MRI imaging by his PCP secondary to symptoms.    MRI cervical spine without contrast on 04/03/2024 revealed:   Multilevel  moderate to severe encroachment into the central spinal canal and neural foramina most severe at C5-6 and C6-7.  There is marked mass effect on the cervical cord at these levels with abnormal increased T2 signal intensity at C5-6 suggesting cord contusion/edema and or myelomalacia.  Moderate to advanced cervical spondylosis  Slight retrolisthesis of C5 on C6  Patient was notified of MRI findings and told to report to ED.  Labs at outside facility revealed WBC 3.50, chloride 108, BUN 13.8, creatinine 1.21, PT 15, PTT 33.6, and INR 1.2.  UA was negative.  Neurosurgery was consulted.  Patient was admitted to hospital medicine service at [facility] on 04/04/2024 and transferred to [facility] on 04/05/2024.  On exam patient endorsed falling off over roof about 20 years ago with chronic neck and low back pain since.  Patient denied previous surgical procedures to neck or back.  Patient reported about 2 months ago he began having increased neck pain with right arm paresthesias.  Patient reported for about the past month he has had numbness to left fingertips.  Patient also endorsed urinary retention for about the past 6 months with dysuria for the past month.  Patient denied bowel incontinence, fever, chills, recent injury/falls, nausea, vomiting, and abdominal pain.    PAST MEDICAL HISTORY:   Hypertension  Hyperlipidemia  PVD  DVT  GERD  COPD  Cross's esophagus   Anxiety    PAST SURGICAL HISTORY:     Past Surgical History:   Procedure Laterality Date    EGD, WITH CLOSED BIOPSY N/A 5/25/2023    Procedure: EGD, WITH CLOSED BIOPSY;  Surgeon: Michele Willson III, MD;  Location: [facility];  Service: Endoscopy;  Laterality: N/A;  A.) Bx of antrum for h-pylori  B.) Bx of antrum pathology analysis  C.) Bx of angularis    ESOPHAGOGASTRODUODENOSCOPY N/A 5/25/2023    Procedure: EGD (ESOPHAGOGASTRODUODENOSCOPY);  Surgeon: Michele Willson III, MD;  Location: [facility];  Service: Endoscopy;  Laterality: N/A;  Moderate Gastritis     INTRAVASCULAR ULTRASOUND, NON-CORONARY  10/11/2022    Procedure: Intravascular Ultrasound, Non-Coronary;  Surgeon: Timbo Clark MD;  Location: [facility] CATH LAB;  Service: Cardiology;;       FAMILY HISTORY:   Mother and father:  Hypertension and DM    SOCIAL HISTORY:   Former tobacco use:  Quit 18 years ago  Former alcohol use:  Quit over a year ago  Denies illicit drug use    ALLERGIES:   Patient has no known allergies.    HOME MEDICATIONS:     Prior to Admission medications    Medication Sig Start Date End Date Taking? Authorizing Provider   amitriptyline (ELAVIL) 25 MG tablet Take 25 mg by mouth every evening.   Yes Provider, Historical   amLODIPine (NORVASC) 5 MG tablet Take 5 mg by mouth every evening. 10/3/22  Yes Provider, Historical   dicyclomine (BENTYL) 20 mg tablet Take 20 mg by mouth every 8 (eight) hours as needed.   Yes Provider, Historical   ezetimibe (ZETIA) 10 mg tablet Take 10 mg by mouth every evening. 10/4/22  Yes Provider, Historical   isosorbide mononitrate (IMDUR) 30 MG 24 hr tablet Take 30 mg by mouth once daily. 3/27/24  Yes Provider, Historical   LINZESS 145 mcg Cap capsule Take 145 mcg by mouth daily as needed. 10/3/22  Yes Provider, Historical   lisinopriL (PRINIVIL,ZESTRIL) 40 MG tablet Take 40 mg by mouth every morning. 9/26/22  Yes Provider, Historical   metoprolol succinate (TOPROL-XL) 50 MG 24 hr tablet Take 50 mg by mouth 2 (two) times daily. 7/8/22  Yes Provider, Historical   pantoprazole (PROTONIX) 40 MG tablet Take 40 mg by mouth every morning. 6/6/22  Yes Provider, Historical   phenazopyridine (PYRIDIUM) 200 MG tablet Take 200 mg by mouth 3 (three) times daily with meals. 3/26/24  Yes Provider, Historical   psyllium husk, with sugar, (METAMUCIL, WITH SUGAR,) 3.4 gram packet Take 1 packet by mouth once daily.   Yes Provider, Historical   traZODone (DESYREL) 50 MG tablet Take 50 mg by mouth every evening. 3/27/24  Yes Provider, Historical   XARELTO 20 mg Tab Take 1 tablet (20 mg  total) by mouth every morning. Stopped 5/20/23 5/27/23  Yes Michele Willson III, MD   clopidogreL (PLAVIX) 75 mg tablet Take 1 tablet (75 mg total) by mouth once daily. for 90 doses 5/27/23 8/25/23  Michele Willson III, MD   gabapentin (NEURONTIN) 300 MG capsule Take 1 capsule (300 mg total) by mouth 3 (three) times daily. for 14 days 3/6/24 3/20/24  Ashu Lopez MD   cetirizine (ZYRTEC) 10 MG tablet Take 10 mg by mouth every morning. 4/14/23 5/2/23  Provider, Historical   furosemide (LASIX) 20 MG tablet Take 1 tablet by mouth daily as needed. 4/12/23 5/2/23  Provider, Historical   tamsulosin (FLOMAX) 0.4 mg Cap Take 1 capsule by mouth every morning. 4/12/23 5/2/23  Provider, Historical     ________________________________________________________________________  INPATIENT LIST OF MEDICATIONS     Current Facility-Administered Medications:     aluminum-magnesium hydroxide-simethicone 200-200-20 mg/5 mL suspension 30 mL, 30 mL, Oral, QID (AC & HS), Saeid Iyer MD, 30 mL at 04/05/24 0645    amitriptyline tablet 25 mg, 25 mg, Oral, QHSJaylon Quentin D, MD, 25 mg at 04/04/24 2055    amLODIPine tablet 5 mg, 5 mg, Oral, QHSJaylon Quentin D, MD, 5 mg at 04/04/24 2055    dicyclomine capsule 20 mg, 20 mg, Oral, Q8H PRN, Saeid Iyer MD    ezetimibe tablet 10 mg, 10 mg, Oral, QHS, Saeid Iyer MD, 10 mg at 04/04/24 2055    isosorbide mononitrate 24 hr tablet 30 mg, 30 mg, Oral, Daily, Saeid Iyer MD, 30 mg at 04/04/24 1545    linaCLOtide capsule 145 mcg, 145 mcg, Oral, Daily PRN, Saeid Iyer MD    lisinopriL tablet 40 mg, 40 mg, Oral, QAM, Saeid Iyer MD, 40 mg at 04/05/24 0725    melatonin tablet 6 mg, 6 mg, Oral, Nightly PRN, Saeid Iyer MD    morphine injection 4 mg, 4 mg, Intravenous, Q4H PRN, Saeid Iyer MD, 4 mg at 04/04/24 1715    ondansetron injection 4 mg, 4 mg, Intravenous, Q8H PRN, Saeid Iyer MD, 4 mg at 04/04/24 1803     pantoprazole EC tablet 40 mg, 40 mg, Oral, QAM, Saeid Iyer MD, 40 mg at 04/05/24 0725    sodium chloride 0.9% flush 10 mL, 10 mL, Intravenous, PRN, Saeid Iyer MD    traZODone tablet 50 mg, 50 mg, Oral, QHS, Saeid Iyer MD, 50 mg at 04/04/24 2055    Facility-Administered Medications Ordered in Other Encounters:     diazePAM tablet 10 mg, 10 mg, Oral, On Call Procedure, Timbo Clark MD, 5 mg at 10/11/22 0831    diphenhydrAMINE capsule 50 mg, 50 mg, Oral, On Call Procedure, Timbo Clark MD, 50 mg at 10/11/22 0831    Scheduled Meds:   aluminum-magnesium hydroxide-simethicone  30 mL Oral QID (AC & HS)    amitriptyline  25 mg Oral QHS    amLODIPine  5 mg Oral QHS    ezetimibe  10 mg Oral QHS    isosorbide mononitrate  30 mg Oral Daily    lisinopriL  40 mg Oral QAM    pantoprazole  40 mg Oral QAM    traZODone  50 mg Oral QHS     Continuous Infusions:  PRN Meds:.dicyclomine, linaCLOtide, melatonin, morphine, ondansetron, sodium chloride 0.9%      REVIEW OF SYSTEMS:   Except as documented, all other systems reviewed and negative.    PHYSICAL EXAM:     VITAL SIGNS: 24 HRS MIN & MAX LAST   Temp  Min: 97.5 °F (36.4 °C)  Max: 97.9 °F (36.6 °C) 97.9 °F (36.6 °C)   BP  Min: 108/71  Max: 136/89 121/75   Pulse  Min: 60  Max: 69  67   Resp  Min: 16  Max: 20 16   SpO2  Min: 94 %  Max: 99 % (!) 94 %       General appearance: Male in no apparent distress.  HENT: Atraumatic head.   Eyes: Normal extraocular movements.   Neck: Supple.   Lungs: Clear to auscultation bilaterally.   Heart: Regular rate and rhythm.  Abdomen: Soft, non-distended, non-tender.  Extremities: No cyanosis, clubbing, or deformities.   Skin: No Rash.   Neuro: Awake, alert, and oriented.  Decreased sensation to right upper extremity.  4/5 strength to right upper extremity.  5/5 strength to left upper extremity   Psych/mental status: Appropriate mood and affect. Responds appropriately to questions.     LABS AND IMAGING:     Recent Labs   Lab  04/04/24  1156   WBC 3.50*   RBC 5.22   HGB 16.1   HCT 49.1   MCV 94.1*   MCH 30.8   MCHC 32.8*   RDW 12.6      MPV 9.2       Recent Labs   Lab 04/04/24  1156      K 5.0   CO2 23   BUN 13.8   CREATININE 1.21*   CALCIUM 9.4   ALBUMIN 3.9   ALKPHOS 79   ALT 37   AST 33   BILITOT 0.7       Microbiology Results (last 7 days)       ** No results found for the last 168 hours. **             MRI Cervical Spine Without Contrast  Narrative: EXAMINATION:  MRI CERVICAL SPINE WITHOUT CONTRAST:    CLINICAL HISTORY:  Cervicalgia, m54.2;    COMPARISON:  None available    FINDINGS:  Serial axial and sagittal 1.5 Jill images obtained using T1 and T2 weighted techniques without the administration of intravenous contrast. Vertebral body height is grossly intact.  There is slight retrolisthesis of a C5 on C6..  Cerebellar tonsils extend caudally to the level of foramen magnum.  The cervical cord is grossly normal in size.  There is thinning/mass effect on the cervical cord at the level of C5-6 with focal increased T2 signal intensity.  Disc space narrowing, degenerative disc changes, osteophyte formation, and facet arthrosis identified.  No abnormal paraspinal mass is identified without the administration of IV contrast.  There is mild motion artifact.  Bone marrow signal intensity is heterogeneous throughout.    C2-3: No significant central spinal or neural foraminal stenosis.  Mild posterior disc bulge.  Mild facet arthrosis.    C3-4: Moderate encroachment into the central spinal canal and neural foramina bilaterally secondary to posterior disc bulge, posterior osteophyte formation, and facet arthrosis.  There is mass effect on the cervical cord.    C4-5: Mild to moderate encroachment into the central spinal canal and neural foramina bilaterally secondary to mild posterior disc bulge, posterior osteophyte formation, and facet arthrosis    C5-6: Severe encroachment into the central spinal canal and moderate moderate to  severe encroachment into the neural foramina bilaterally secondary to posterior disc bulge, posterior osteophyte formation, slight retrolisthesis of C5 on C6, and facet arthrosis. There is marked mass effect on the cervical cord.    C6-7: Severe encroachment into the central spinal canal and moderate to severe encroachment into the neural foramina bilaterally secondary to posterior disc bulge, posterior osteophyte formation, and facet arthrosis.  There is marked mass effect on the cervical cord.    C7-T1: Moderate encroachment into the central spinal canal and neural foramina bilaterally secondary to posterior disc bulge, posterior osteophyte formation, and facet arthrosis.  There is mass effect on the cervical cord.  Impression: 1. Multilevel moderate to severe encroachment into the central spinal canal and neural foramina as described; most severe at C5-6 and C6-7.  There is marked mass effect on the cervical cord at these levels with abnormal increased T2 signal intensity at C5-6 suggesting cord contusion/edema and or myelomalacia.  2. Moderate to advanced cervical spondylosis  3. Slight retrolisthesis of C5 on C6  4. The covering clinician was notified of the findings on the 04/03/2024 at 15:30.    Electronically signed by: Phil Devine  Date:    04/03/2024  Time:    15:24        ASSESSMENT & PLAN:   Assessment:   Cervical cord compression   Urinary retention   Dysuria  CAROL, mild   History of hypertension, hyperlipidemia, PVD, DVT on Xarelto, GERD, COPD, Cross's esophagus, and anxiety       Plan:  Pain control   Neurosurgery consulted, appreciate recommendations   NS at 100 mL/hr   Bladder scan   UA negative  Resume home medications as deemed appropriate once medication reconciliation is updated  Labs in AM    VTE Prophylaxis:  Resume Xarelto when cleared by Neurosurgery    Discharge Planning and Disposition: [facility]    I, Tereso Mayfield PA-C have reviewed and discussed the case with Gilda Restrepo MD   Please  see the attending MD's addendum for further assessment and plan.    Tereso Mayfield PA-C  Department of Hospital Medicine   [facility]   04/05/2024    This note was created with the assistance of [employer] voice recognition software. There may be transcription errors as a result of using this technology, however minimal. Effort has been made to assure accuracy of transcription, but any obvious errors or omissions should be clarified with the author of the document.    _______________________________________________________________________________  MD Addendum:  I,  , assumed care of this patient today at --am/pm  For the patient encounter, I performed the substantive portion of the visit, I reviewed the NP/PA documentation, treatment plan, and medical decision making.  I had face to face time with this patient     A. History:    B. Physical exam:    C. Medical decision making:      All diagnosis and differential diagnosis have been reviewed; assessment and plan has been documented; I have personally reviewed the labs and test results that are presently available; I have reviewed the patients medication list; I have reviewed the consulting providers response and recommendations. I have reviewed or attempted to review medical records based upon their availability.    All of the patient and family questions have been addressed and answered. Patient's is agreeable to the above stated plan. I will continue to monitor closely and make adjustments to medical management as needed.      04/05/2024

## 2024-04-05 NOTE — CONSULTS
[facility]  Neurosurgery  Consult Note    Inpatient consult to Neurosurgery  Consult performed by: Cheryl Talavera PA  Consult ordered by: Saeid Iyer MD        Subjective:     Chief Complaint/Reason for Admission: Neck pain, UE weakness    History of Present Illness: Patient is a 64 y.o. male with a past medical history of hypertension, hyperlipidemia, PVD, DVT on Xarelto, GERD, COPD, Cross's esophagus, and anxiety, who presented to [facility] ED on 4/4/2024 with c/o neck pain.  Patient reported worsening neck pain with paresthesias to right arm for about the past 2 months.  Patient reported history of chronic neck pain with history of previous injury 20 years ago after falling off a roof.  Patient reported recent MRI imaging by his PCP secondary to symptoms.    MRI cervical spine without contrast on 04/03/2024 revealed:   Multilevel moderate to severe encroachment into the central spinal canal and neural foramina most severe at C5-6 and C6-7.  There is marked mass effect on the cervical cord at these levels with abnormal increased T2 signal intensity at C5-6 suggesting cord contusion/edema and or myelomalacia.  Moderate to advanced cervical spondylosis  Slight retrolisthesis of C5 on C6  Patient was notified of MRI findings and told to report to ED. Dr. Dumont was consulted for evaluation and treatment recommendations.    On PE this am he is sitting up in bed, NAD. He c/o posterior neck and trap pain. He has numbness into the right shoulder, forearm and hand. He also reports numbness in the left hand. This has progressed over the last 2 months. He denies LE complaints. He does have some difficulty urinating intermittently. He denies bowel dysfunction.    PTA Medications   Medication Sig    amitriptyline (ELAVIL) 25 MG tablet Take 25 mg by mouth every evening.    amLODIPine (NORVASC) 5 MG tablet Take 5 mg by mouth every evening.    dicyclomine (BENTYL) 20 mg tablet Take 20  mg by mouth every 8 (eight) hours as needed.    ezetimibe (ZETIA) 10 mg tablet Take 10 mg by mouth every evening.    isosorbide mononitrate (IMDUR) 30 MG 24 hr tablet Take 30 mg by mouth once daily.    LINZESS 145 mcg Cap capsule Take 145 mcg by mouth daily as needed.    lisinopriL (PRINIVIL,ZESTRIL) 40 MG tablet Take 40 mg by mouth every morning.    metoprolol succinate (TOPROL-XL) 50 MG 24 hr tablet Take 50 mg by mouth 2 (two) times daily.    pantoprazole (PROTONIX) 40 MG tablet Take 40 mg by mouth every morning.    phenazopyridine (PYRIDIUM) 200 MG tablet Take 200 mg by mouth 3 (three) times daily with meals.    psyllium husk, with sugar, (METAMUCIL, WITH SUGAR,) 3.4 gram packet Take 1 packet by mouth once daily.    traZODone (DESYREL) 50 MG tablet Take 50 mg by mouth every evening.    XARELTO 20 mg Tab Take 1 tablet (20 mg total) by mouth every morning. Stopped 5/20/23    clopidogreL (PLAVIX) 75 mg tablet Take 1 tablet (75 mg total) by mouth once daily. for 90 doses    gabapentin (NEURONTIN) 300 MG capsule Take 1 capsule (300 mg total) by mouth 3 (three) times daily. for 14 days       Review of patient's allergies indicates:  No Known Allergies    Past Medical History:   Diagnosis Date    Anxiety disorder, unspecified     Cross esophagus     COPD (chronic obstructive pulmonary disease)     DVT (deep venous thrombosis)     Right femoral vein    Frequent urination     GERD (gastroesophageal reflux disease)     High cholesterol     Hypertension     PVD (peripheral vascular disease)     Seasonal allergies      Past Surgical History:   Procedure Laterality Date    EGD, WITH CLOSED BIOPSY N/A 5/25/2023    Procedure: EGD, WITH CLOSED BIOPSY;  Surgeon: Michele Willson III, MD;  Location: [facility];  Service: Endoscopy;  Laterality: N/A;  A.) Bx of antrum for h-pylori  B.) Bx of antrum pathology analysis  C.) Bx of angularis    ESOPHAGOGASTRODUODENOSCOPY N/A 5/25/2023    Procedure: EGD (ESOPHAGOGASTRODUODENOSCOPY);   Surgeon: Michele Willson III, MD;  Location: [facility];  Service: Endoscopy;  Laterality: N/A;  Moderate Gastritis    INTRAVASCULAR ULTRASOUND, NON-CORONARY  10/11/2022    Procedure: Intravascular Ultrasound, Non-Coronary;  Surgeon: Timbo Clark MD;  Location: [facility] CATH LAB;  Service: Cardiology;;     Family History       Problem Relation (Age of Onset)    Diabetes Mother, Father    Hypertension Mother, Father          Tobacco Use    Smoking status: Former     Current packs/day: 0.00     Types: Cigarettes     Quit date:      Years since quittin.2    Smokeless tobacco: Never   Substance and Sexual Activity    Alcohol use: Yes     Comment: Occasionally    Drug use: Never    Sexual activity: Not on file     Review of Systems  Objective:     Weight: 107.8 kg (237 lb 10.5 oz)  Body mass index is 32.23 kg/m².  Vital Signs (Most Recent):  Temp: 97.9 °F (36.6 °C) (24)  Pulse: 67 (24)  Resp: 18 (24)  BP: 121/75 (24)  SpO2: (!) 94 % (24) Vital Signs (24h Range):  Temp:  [97.5 °F (36.4 °C)-97.9 °F (36.6 °C)] 97.9 °F (36.6 °C)  Pulse:  [60-69] 67  Resp:  [16-18] 18  SpO2:  [94 %-99 %] 94 %  BP: (108-136)/(65-89) 121/75     Date 24 07 - 24 0659   Shift 5218-0279 3044-0362 1864-3056 24 Hour Total   INTAKE   P.O. 220   220   Shift Total(mL/kg) 220(2)   220(2)   OUTPUT   Shift Total(mL/kg)       Weight (kg) 107.8 107.8 107.8 107.8                            Physical Exam:  Nursing note and vitals reviewed.    Constitutional: He appears well-developed and well-nourished. No distress.     Eyes: Pupils are equal, round, and reactive to light. EOM are normal.     Cardiovascular: Intact distal pulses.     Psych/Behavior: He is alert. He is oriented to person, place, and time. He has a normal mood and affect.     Musculoskeletal:        Neck: Range of motion is limited. There is tenderness.        Back: Range of motion is full. There is no tenderness.       Comments: 5/5 motor in bilateral UE with exception of bilateral intrinsics, 4/5  5/5 motor in bilateral LE  Sensation grossly intact with numbness/tingling in right arm and left hand     Neurological:        Sensory: There is sensory deficit in the extremities. Sensory deficit is located Right shoulder, forearm and hand. Left hand.        DTRs: Tricep reflexes are 2+ on the right side and 3+ on the left side. Bicep reflexes are 2+ on the right side and 3+ on the left side. Patellar reflexes are 2+ on the right side and 2+ on the left side. Achilles reflexes are 1+ on the right side and 1+ on the left side. He displays no Babinski's sign on the right side. He displays no Babinski's sign on the left side.   +Hoffmans on the left  Neg clonus       Cranial nerves: Cranial nerve(s) II, III, IV, V, VI, VII, VIII, IX, X, XI and XII are intact.       Significant Labs:  Recent Labs   Lab 04/04/24  1156 04/05/24  0828    137   K 5.0 4.3   CO2 23 23   BUN 13.8 13.3   CREATININE 1.21* 1.33*   CALCIUM 9.4 9.5     Recent Labs   Lab 04/04/24  1156 04/05/24  0828   WBC 3.50* 4.02*   HGB 16.1 15.1   HCT 49.1 46.3    271     Recent Labs   Lab 04/04/24  1157   INR 1.2*     Microbiology Results (last 7 days)       ** No results found for the last 168 hours. **            Significant Diagnostics:  MRI CERVICAL SPINE WITHOUT CONTRAST:     CLINICAL HISTORY:  Cervicalgia, m54.2;     COMPARISON:  None available     FINDINGS:  Serial axial and sagittal 1.5 Jill images obtained using T1 and T2 weighted techniques without the administration of intravenous contrast. Vertebral body height is grossly intact.  There is slight retrolisthesis of a C5 on C6..  Cerebellar tonsils extend caudally to the level of foramen magnum.  The cervical cord is grossly normal in size.  There is thinning/mass effect on the cervical cord at the level of C5-6 with focal increased T2 signal intensity.  Disc space narrowing, degenerative disc changes,  osteophyte formation, and facet arthrosis identified.  No abnormal paraspinal mass is identified without the administration of IV contrast.  There is mild motion artifact.  Bone marrow signal intensity is heterogeneous throughout.     C2-3: No significant central spinal or neural foraminal stenosis.  Mild posterior disc bulge.  Mild facet arthrosis.     C3-4: Moderate encroachment into the central spinal canal and neural foramina bilaterally secondary to posterior disc bulge, posterior osteophyte formation, and facet arthrosis.  There is mass effect on the cervical cord.     C4-5: Mild to moderate encroachment into the central spinal canal and neural foramina bilaterally secondary to mild posterior disc bulge, posterior osteophyte formation, and facet arthrosis     C5-6: Severe encroachment into the central spinal canal and moderate moderate to severe encroachment into the neural foramina bilaterally secondary to posterior disc bulge, posterior osteophyte formation, slight retrolisthesis of C5 on C6, and facet arthrosis. There is marked mass effect on the cervical cord.     C6-7: Severe encroachment into the central spinal canal and moderate to severe encroachment into the neural foramina bilaterally secondary to posterior disc bulge, posterior osteophyte formation, and facet arthrosis.  There is marked mass effect on the cervical cord.     C7-T1: Moderate encroachment into the central spinal canal and neural foramina bilaterally secondary to posterior disc bulge, posterior osteophyte formation, and facet arthrosis.  There is mass effect on the cervical cord.     Impression:     1. Multilevel moderate to severe encroachment into the central spinal canal and neural foramina as described; most severe at C5-6 and C6-7.  There is marked mass effect on the cervical cord at these levels with abnormal increased T2 signal intensity at C5-6 suggesting cord contusion/edema and or myelomalacia.  2. Moderate to advanced  cervical spondylosis  3. Slight retrolisthesis of C5 on C6  4. The covering clinician was notified of the findings on the 04/03/2024 at 15:30.    Assessment/Plan:     Active Diagnoses:    Diagnosis Date Noted POA    PRINCIPAL PROBLEM:  Spinal cord compression [G95.20] 04/04/2024 Yes      Problems Resolved During this Admission:     Cervical stenosis  Cervical radiculopathy  Cervical myelopathy    He c/o numbness/tingling in bilateral UE, worsening over the last 2 months. He has weakness in  strength bilateral, right worse than left.  MRI reviewed; multilevel central canal stenosis, most severe at C5-6 and C6-7  Recommendation is to proceed with cervical decompression tomorrow. Patient states that he takes care of his two grandkids and would need to make arrangements for them prior to having surgery.  Discussed wanting to go home and postponing surgery until next week  I will discuss with Dr. Dumont; further recs to follow    Thank you for your consult. I will follow-up with patient. Please contact us if you have any additional questions.    TIRSO Clark  Neurosurgery  [facility]

## 2024-04-08 LAB
OHS QRS DURATION: 94 MS
OHS QTC CALCULATION: 429 MS

## 2024-04-13 NOTE — DISCHARGE SUMMARY
[facility] Medicine Discharge Summary    Admit Date: 4/4/2024  Discharge Date and Time:  4/5/2024, 03:42 pm  Admitting Physician:  Team  Discharging Physician: Gilda Restrepo MD.  Primary Care Physician: Mildred Silverio MD  Consults: Neurosurgery    Discharge Diagnoses:  Cervical disc disease with radiculopathy and myelopathy   Cervical spinal canal stenosis and cord compression with edema and myelomalacia.   Urinary hesitancy - Related to cervical disc disease vs BPH  Chronic kidney disease , stage III- stable at baseline    Hx- HTN, HLP, PVD. DVT, GERD, Duglas's esophagus, Former smoker, COPD, and anxiety.     Hospital Course:     Marko Heard is a 64 y.o. male with a past medical history of hypertension, hyperlipidemia, PVD, DVT on Xarelto, GERD, COPD, Cross's esophagus, and anxiety who presented to [facility] ED on 4/4/2024 with c/o neck pain.  Patient reported worsening neck pain with paresthesias to right arm for about the past 2 months.  Patient reported history of chronic neck pain with history of previous injury 20 years ago after falling off a roof.  Patient reported recent MRI imaging by his PCP secondary to worsening symptoms.  MRI cervical spine without contrast on 04/03/2024 revealed:   Multilevel moderate to severe encroachment into the central spinal canal and neural foramina most severe at C5-6 and C6-7.  There is marked mass effect on the cervical cord at these levels with abnormal increased T2 signal intensity at C5-6 suggesting cord contusion/edema and or myelomalacia.  Moderate to advanced cervical spondylosis  Slight retrolisthesis of C5 on C6  Patient was notified of MRI findings and told to report to ED. Pt presented to [facility] and subsequently transferred to [facility]. Labs at outside facility revealed WBC 3.50, chloride 108, BUN 13.8, creatinine 1.21, PT 15, PTT 33.6, and INR 1.2.  UA was negative.  Neurosurgery was consulted.  Patient was  admitted to hospital medicine service  on 04/05/2024.    Patient reported about 2 months ago he began having increased neck pain with right arm paresthesias.  Patient reported for about the past month he has had numbness to left fingertips.  Patient also endorsed urinary hesitancy and incomplete emptying for about the past 6 months with dysuria for the past month.  Patient denied bowel incontinence, fever, chills, recent injury/falls, nausea, vomiting, and abdominal pain.     Neurosurgery was consulted and suggested to proceed with cervical decompression surgery on a urgent basis possibly tomorrow , however pt declined die to some domestic issues . Pt discussed his domestic problem with Dr. Dumont who approved discharge scheduled clinic visit this Friday .  I discussed pt's home meds Elavil which can worsen urinary emptying. Pt is willing to Stop Elavil. Flomax was added. Pt was given Prednisone 40 mg daily x 5 days until seen by Dr. Dumont in the clinic.         Pt was seen and examined on the day of discharge  Vitals:  VITAL SIGNS: 24 HRS MIN & MAX LAST   No data recorded 98.3 °F (36.8 °C)   No data recorded 113/69   No data recorded  62   No data recorded 18   No data recorded (!) 91 %       Physical Exam:  General appearance: Male in no apparent distress.  HENT: Atraumatic head.   Eyes: Normal extraocular movements.   Neck: Supple.   Lungs: Clear to auscultation bilaterally.   Heart: Regular rate and rhythm.  Abdomen: Soft, non-distended, non-tender.  Extremities: No cyanosis, clubbing, or deformities.   Skin: No Rash.   Neuro: Awake, alert, and oriented.  Decreased sensation to right upper extremity.  4/5 strength to right upper extremity.  5/5 strength to left upper extremity   Psych/mental status: Appropriate mood and affect. Responds appropriately to questions.     Procedures Performed: No admission procedures for hospital encounter.     Significant Diagnostic Studies: See Full reports for all details    No  "results for input(s): "WBC", "RBC", "HGB", "HCT", "MCV", "MCH", "MCHC", "RDW", "PLT", "MPV", "GRAN", "LYMPH", "MONO", "BASO", "NRBC" in the last 168 hours.    No results for input(s): "NA", "K", "CL", "CO2", "ANIONGAP", "BUN", "CREATININE", "GLU", "CALCIUM", "PH", "MG", "ALBUMIN", "PROT", "ALKPHOS", "ALT", "AST", "BILITOT" in the last 168 hours.     Microbiology Results (last 7 days)       ** No results found for the last 168 hours. **             CT Abdomen Pelvis  Without Contrast  Narrative: EXAMINATION:  CT ABDOMEN PELVIS WITHOUT CONTRAST    CLINICAL HISTORY:  Flank pain, kidney stone suspected;    TECHNIQUE:  Low dose axial images, sagittal and coronal reformations were obtained from the lung bases to the pubic symphysis.  No contrast was administered.  Automatic exposure control is utilized to reduce patient radiation exposure.    COMPARISON:  None    FINDINGS:  The lung bases are clear.    The liver appears normal.  No obvious liver mass or lesion is seen.    Gallbladder appears normal.  No gallstones are seen.    The pancreas appears normal.  No inflammatory changes are seen in the pancreatic region.    The spleen appears normal and adrenal glands appear normal.  No adrenal nodule is seen.    No nephrolithiasis is seen.  No hydronephrosis is seen.  No hydroureter is seen.  No ureteral stone is seen.    No colitis is seen.  No diverticulitis is seen.  No appendicitis is seen.    There are bilateral iliac vein stents.    There are small inguinal hernias bilaterally containing some fat.  No obstruction is seen.    No free air seen.  No free fluid is seen.  The urinary bladder appears normal.    Bones show no acute abnormality.  Impression: Small inguinal hernias bilaterally containing fat    Otherwise unremarkable    Electronically signed by: Dalton Ozuna  Date:    04/05/2024  Time:    12:35         Medication List        START taking these medications      tamsulosin 0.4 mg Cap  Commonly known as: " FLOMAX  Take 1 capsule (0.4 mg total) by mouth once daily. At night for bladder            CONTINUE taking these medications      amLODIPine 5 MG tablet  Commonly known as: NORVASC     ezetimibe 10 mg tablet  Commonly known as: ZETIA     gabapentin 300 MG capsule  Commonly known as: NEURONTIN  Take 1 capsule (300 mg total) by mouth 3 (three) times daily. for 14 days     isosorbide mononitrate 30 MG 24 hr tablet  Commonly known as: IMDUR     LINZESS 145 mcg Cap capsule  Generic drug: linaCLOtide     lisinopriL 40 MG tablet  Commonly known as: PRINIVIL,ZESTRIL     METAMUCIL (WITH SUGAR) 3.4 gram packet  Generic drug: psyllium husk (with sugar)     metoprolol succinate 50 MG 24 hr tablet  Commonly known as: TOPROL-XL     pantoprazole 40 MG tablet  Commonly known as: PROTONIX     traZODone 50 MG tablet  Commonly known as: DESYREL     XARELTO 20 mg Tab  Generic drug: rivaroxaban  Take 1 tablet (20 mg total) by mouth every morning. Stopped 5/20/23            STOP taking these medications      amitriptyline 25 MG tablet  Commonly known as: ELAVIL     clopidogreL 75 mg tablet  Commonly known as: PLAVIX     dicyclomine 20 mg tablet  Commonly known as: BENTYL     phenazopyridine 200 MG tablet  Commonly known as: PYRIDIUM            ASK your doctor about these medications      predniSONE 20 MG tablet  Commonly known as: DELTASONE  Take 2 tablets (40 mg total) by mouth once daily. For back pain for 5 days  Ask about: Should I take this medication?               Where to Get Your Medications        These medications were sent to [facility] - [address]  [address] [facility] [address] [address]      Phone: [phone]   predniSONE 20 MG tablet  tamsulosin 0.4 mg Cap          Explained in detail to the patient about the discharge plan, medications, and follow-up visits. Pt understands and agrees with the treatment plan  Discharge Disposition: Short Term  Hospital   Discharged Condition: stable  Diet-    Medications Per DC med rec  Activities as tolerated   Follow-up Information       Tahir Dumont MD. Go on 4/9/2024.    Specialty: Neurosurgery  Why: 4/9/24 @09:00 am, Keep appointment as scheduled  Contact information:  [address] [place] [id number] [phone]                           For further questions contact hospitalist office    Discharge time 33 minutes    For worsening symptoms, chest pain, shortness of breath, increased abdominal pain, high grade fever, stroke or stroke like symptoms, immediately go to the nearest Emergency Room or call 911 as soon as possible.      Gilda Hall MD on 4/5/2024, 03:42 pm

## 2024-11-04 ENCOUNTER — HOSPITAL ENCOUNTER (EMERGENCY)
Facility: HOSPITAL | Age: 64
Discharge: HOME | End: 2024-11-04
Attending: STUDENT IN AN ORGANIZED HEALTH CARE EDUCATION/TRAINING PROGRAM
Payer: MEDICAID

## 2024-11-04 VITALS
TEMPERATURE: 97 F | HEART RATE: 56 BPM | SYSTOLIC BLOOD PRESSURE: 147 MMHG | DIASTOLIC BLOOD PRESSURE: 103 MMHG | OXYGEN SATURATION: 98 % | HEIGHT: 72 IN | BODY MASS INDEX: 29.8 KG/M2 | RESPIRATION RATE: 15 BRPM | WEIGHT: 220 LBS

## 2024-11-04 DIAGNOSIS — I82.5Z1 CHRONIC DEEP VEIN THROMBOSIS (DVT) OF DISTAL VEIN OF RIGHT LOWER EXTREMITY: ICD-10-CM

## 2024-11-04 DIAGNOSIS — R10.9 FLANK PAIN: Primary | ICD-10-CM

## 2024-11-04 DIAGNOSIS — R60.9 SWELLING: ICD-10-CM

## 2024-11-04 DIAGNOSIS — M79.89 LEG SWELLING: ICD-10-CM

## 2024-11-04 LAB
ALBUMIN SERPL-MCNC: 3.6 G/DL (ref 3.4–4.8)
ALBUMIN/GLOB SERPL: 1 RATIO (ref 1.1–2)
ALP SERPL-CCNC: 86 UNIT/L (ref 40–150)
ALT SERPL-CCNC: 32 UNIT/L (ref 0–55)
ANION GAP SERPL CALC-SCNC: 8 MEQ/L
AST SERPL-CCNC: 25 UNIT/L (ref 5–34)
BACTERIA #/AREA URNS AUTO: NORMAL /HPF
BASOPHILS # BLD AUTO: 0.04 X10(3)/MCL
BASOPHILS NFR BLD AUTO: 1.1 %
BILIRUB SERPL-MCNC: 0.6 MG/DL
BILIRUB UR QL STRIP.AUTO: NEGATIVE
BNP BLD-MCNC: 69.3 PG/ML
BUN SERPL-MCNC: 11.8 MG/DL (ref 8.4–25.7)
CALCIUM SERPL-MCNC: 9.1 MG/DL (ref 8.8–10)
CHLORIDE SERPL-SCNC: 109 MMOL/L (ref 98–107)
CLARITY UR: CLEAR
CO2 SERPL-SCNC: 23 MMOL/L (ref 23–31)
COLOR UR AUTO: NORMAL
CREAT SERPL-MCNC: 1.17 MG/DL (ref 0.72–1.25)
CREAT/UREA NIT SERPL: 10
EOSINOPHIL # BLD AUTO: 0.38 X10(3)/MCL (ref 0–0.9)
EOSINOPHIL NFR BLD AUTO: 10.2 %
ERYTHROCYTE [DISTWIDTH] IN BLOOD BY AUTOMATED COUNT: 13.3 % (ref 11.5–17)
GFR SERPLBLD CREATININE-BSD FMLA CKD-EPI: >60 ML/MIN/1.73/M2
GLOBULIN SER-MCNC: 3.6 GM/DL (ref 2.4–3.5)
GLUCOSE SERPL-MCNC: 96 MG/DL (ref 82–115)
GLUCOSE UR QL STRIP: NORMAL
HCT VFR BLD AUTO: 44.7 % (ref 42–52)
HGB BLD-MCNC: 15.1 G/DL (ref 14–18)
HGB UR QL STRIP: NEGATIVE
IMM GRANULOCYTES # BLD AUTO: 0.01 X10(3)/MCL (ref 0–0.04)
IMM GRANULOCYTES NFR BLD AUTO: 0.3 %
KETONES UR QL STRIP: NEGATIVE
LEUKOCYTE ESTERASE UR QL STRIP: NEGATIVE
LYMPHOCYTES # BLD AUTO: 1.19 X10(3)/MCL (ref 0.6–4.6)
LYMPHOCYTES NFR BLD AUTO: 31.9 %
MAGNESIUM SERPL-MCNC: 2 MG/DL (ref 1.6–2.6)
MCH RBC QN AUTO: 30.8 PG (ref 27–31)
MCHC RBC AUTO-ENTMCNC: 33.8 G/DL (ref 33–36)
MCV RBC AUTO: 91.2 FL (ref 80–94)
MONOCYTES # BLD AUTO: 0.42 X10(3)/MCL (ref 0.1–1.3)
MONOCYTES NFR BLD AUTO: 11.3 %
NEUTROPHILS # BLD AUTO: 1.69 X10(3)/MCL (ref 2.1–9.2)
NEUTROPHILS NFR BLD AUTO: 45.2 %
NITRITE UR QL STRIP: NEGATIVE
NRBC BLD AUTO-RTO: 0 %
PH UR STRIP: 6 [PH]
PLATELET # BLD AUTO: 280 X10(3)/MCL (ref 130–400)
PMV BLD AUTO: 9.4 FL (ref 7.4–10.4)
POTASSIUM SERPL-SCNC: 4.3 MMOL/L (ref 3.5–5.1)
PROT SERPL-MCNC: 7.2 GM/DL (ref 5.8–7.6)
PROT UR QL STRIP: NEGATIVE
RBC # BLD AUTO: 4.9 X10(6)/MCL (ref 4.7–6.1)
RBC #/AREA URNS AUTO: NORMAL /HPF
SODIUM SERPL-SCNC: 140 MMOL/L (ref 136–145)
SP GR UR STRIP.AUTO: 1.01 (ref 1–1.03)
SQUAMOUS #/AREA URNS LPF: NORMAL /HPF
TROPONIN I SERPL-MCNC: <0.01 NG/ML (ref 0–0.04)
UROBILINOGEN UR STRIP-ACNC: NORMAL
WBC # BLD AUTO: 3.73 X10(3)/MCL (ref 4.5–11.5)
WBC #/AREA URNS AUTO: NORMAL /HPF

## 2024-11-04 PROCEDURE — 83735 ASSAY OF MAGNESIUM: CPT | Performed by: STUDENT IN AN ORGANIZED HEALTH CARE EDUCATION/TRAINING PROGRAM

## 2024-11-04 PROCEDURE — 99285 EMERGENCY DEPT VISIT HI MDM: CPT | Mod: 25

## 2024-11-04 PROCEDURE — 96374 THER/PROPH/DIAG INJ IV PUSH: CPT

## 2024-11-04 PROCEDURE — 63600175 PHARM REV CODE 636 W HCPCS: Performed by: STUDENT IN AN ORGANIZED HEALTH CARE EDUCATION/TRAINING PROGRAM

## 2024-11-04 PROCEDURE — 93005 ELECTROCARDIOGRAM TRACING: CPT

## 2024-11-04 PROCEDURE — 25000003 PHARM REV CODE 250: Performed by: STUDENT IN AN ORGANIZED HEALTH CARE EDUCATION/TRAINING PROGRAM

## 2024-11-04 PROCEDURE — 85025 COMPLETE CBC W/AUTO DIFF WBC: CPT | Performed by: STUDENT IN AN ORGANIZED HEALTH CARE EDUCATION/TRAINING PROGRAM

## 2024-11-04 PROCEDURE — 84484 ASSAY OF TROPONIN QUANT: CPT

## 2024-11-04 PROCEDURE — 83880 ASSAY OF NATRIURETIC PEPTIDE: CPT | Performed by: STUDENT IN AN ORGANIZED HEALTH CARE EDUCATION/TRAINING PROGRAM

## 2024-11-04 PROCEDURE — 81001 URINALYSIS AUTO W/SCOPE: CPT | Performed by: STUDENT IN AN ORGANIZED HEALTH CARE EDUCATION/TRAINING PROGRAM

## 2024-11-04 PROCEDURE — 84484 ASSAY OF TROPONIN QUANT: CPT | Performed by: STUDENT IN AN ORGANIZED HEALTH CARE EDUCATION/TRAINING PROGRAM

## 2024-11-04 PROCEDURE — 80053 COMPREHEN METABOLIC PANEL: CPT | Performed by: STUDENT IN AN ORGANIZED HEALTH CARE EDUCATION/TRAINING PROGRAM

## 2024-11-04 PROCEDURE — 25500020 PHARM REV CODE 255: Performed by: STUDENT IN AN ORGANIZED HEALTH CARE EDUCATION/TRAINING PROGRAM

## 2024-11-04 PROCEDURE — 93010 ELECTROCARDIOGRAM REPORT: CPT | Mod: ,,, | Performed by: INTERNAL MEDICINE

## 2024-11-04 RX ORDER — METHOCARBAMOL 500 MG/1
1000 TABLET, FILM COATED ORAL 3 TIMES DAILY
Qty: 30 TABLET | Refills: 0 | Status: SHIPPED | OUTPATIENT
Start: 2024-11-04 | End: 2024-11-09

## 2024-11-04 RX ORDER — ACETAMINOPHEN 500 MG
1000 TABLET ORAL
Status: COMPLETED | OUTPATIENT
Start: 2024-11-04 | End: 2024-11-04

## 2024-11-04 RX ORDER — PHENAZOPYRIDINE HYDROCHLORIDE 100 MG/1
100 TABLET, FILM COATED ORAL 3 TIMES DAILY PRN
Qty: 20 TABLET | Refills: 0 | Status: SHIPPED | OUTPATIENT
Start: 2024-11-04 | End: 2024-11-14

## 2024-11-04 RX ORDER — METHOCARBAMOL 100 MG/ML
1000 INJECTION, SOLUTION INTRAMUSCULAR; INTRAVENOUS ONCE
Status: COMPLETED | OUTPATIENT
Start: 2024-11-04 | End: 2024-11-04

## 2024-11-04 RX ADMIN — METHOCARBAMOL 1000 MG: 100 INJECTION INTRAMUSCULAR; INTRAVENOUS at 11:11

## 2024-11-04 RX ADMIN — IOHEXOL 100 ML: 350 INJECTION, SOLUTION INTRAVENOUS at 10:11

## 2024-11-04 RX ADMIN — ACETAMINOPHEN 1000 MG: 500 TABLET ORAL at 11:11

## 2024-11-04 NOTE — ED PROVIDER NOTES
Encounter Date: 11/4/2024    SCRIBE #1 NOTE: I, Walker Shook, am scribing for, and in the presence of,  Ronald Waterman MD. I have scribed the following portions of the note - Other sections scribed: HPI, ROS, PE.       History     Chief Complaint   Patient presents with    Flank Pain     C/o R sided flank pain, burning sensation when urinating, itching all over, and leg swelling x 6 months. Denies hx of kidney disease.     Pt is a 64 y.o. male with a pMHx of COPD, DVT, GERD, HTN, HLD, and PVD presenting to the ED complaining of flank pain. Pt explains that he has had symptoms such as itching, leg swelling, dysuria, and increased urinary frequency worsening over the past six months. He states that he has had some issues in the past with his kidney function, also notes a family history of kidney disease.     The history is provided by the patient. No  was used.     Review of patient's allergies indicates:  No Known Allergies  Past Medical History:   Diagnosis Date    Anxiety disorder, unspecified     Cross esophagus     COPD (chronic obstructive pulmonary disease)     DVT (deep venous thrombosis)     Right femoral vein    Frequent urination     GERD (gastroesophageal reflux disease)     High cholesterol     Hypertension     PVD (peripheral vascular disease)     Seasonal allergies      Past Surgical History:   Procedure Laterality Date    EGD, WITH CLOSED BIOPSY N/A 5/25/2023    Procedure: EGD, WITH CLOSED BIOPSY;  Surgeon: Michele Willson III, MD;  Location: [facility];  Service: Endoscopy;  Laterality: N/A;  A.) Bx of antrum for h-pylori  B.) Bx of antrum pathology analysis  C.) Bx of angularis    ESOPHAGOGASTRODUODENOSCOPY N/A 5/25/2023    Procedure: EGD (ESOPHAGOGASTRODUODENOSCOPY);  Surgeon: Michele Willson III, MD;  Location: [facility];  Service: Endoscopy;  Laterality: N/A;  Moderate Gastritis    INTRAVASCULAR ULTRASOUND, NON-CORONARY  10/11/2022    Procedure: Intravascular  Ultrasound, Non-Coronary;  Surgeon: Timbo Clark MD;  Location: [facility] CATH LAB;  Service: Cardiology;;     Family History   Problem Relation Name Age of Onset    Hypertension Mother      Diabetes Mother      Diabetes Father      Hypertension Father       Social History     Tobacco Use    Smoking status: Former     Current packs/day: 0.00     Types: Cigarettes     Quit date:      Years since quittin.8    Smokeless tobacco: Never   Substance Use Topics    Alcohol use: Yes     Comment: Occasionally    Drug use: Never     Review of Systems   Constitutional:  Negative for chills and fever.        Itching.  Trouble sleeping.   HENT:  Negative for drooling and sore throat.    Eyes:  Negative for pain and redness.   Respiratory:  Negative for shortness of breath, wheezing and stridor.    Cardiovascular:  Positive for leg swelling. Negative for chest pain and palpitations.   Gastrointestinal:  Negative for abdominal pain, nausea and vomiting.   Genitourinary:  Positive for dysuria, flank pain and frequency (increased). Negative for hematuria.   Musculoskeletal:  Negative for back pain, neck pain and neck stiffness.   Skin:  Negative for rash and wound.   Neurological:  Negative for weakness and numbness.   Hematological:  Does not bruise/bleed easily.       Physical Exam     Initial Vitals [24 0756]   BP Pulse Resp Temp SpO2   (!) 176/106 60 17 97.3 °F (36.3 °C) 98 %      MAP       --         Physical Exam    Nursing note and vitals reviewed.  Constitutional: He appears well-developed.   Eyes: EOM are normal. Pupils are equal, round, and reactive to light.   Cardiovascular:  Normal rate and regular rhythm.           No murmur heard.  Palpable pulses.   Pulmonary/Chest: Breath sounds normal. No respiratory distress. He has no wheezes. He has no rales.   Abdominal: Abdomen is soft. He exhibits no distension. There is no abdominal tenderness.   Right side and flank tenderness.   There is right CVA tenderness  (mild).    Neurological: He is alert and oriented to person, place, and time.   Intact sensation   Skin: Skin is warm. Capillary refill takes less than 2 seconds. No rash noted.         ED Course   Procedures  Labs Reviewed   COMPREHENSIVE METABOLIC PANEL - Abnormal       Result Value    Sodium 140      Potassium 4.3      Chloride 109 (*)     CO2 23      Glucose 96      Blood Urea Nitrogen 11.8      Creatinine 1.17      Calcium 9.1      Protein Total 7.2      Albumin 3.6      Globulin 3.6 (*)     Albumin/Globulin Ratio 1.0 (*)     Bilirubin Total 0.6      ALP 86      ALT 32      AST 25      eGFR >60      Anion Gap 8.0      BUN/Creatinine Ratio 10     CBC WITH DIFFERENTIAL - Abnormal    WBC 3.73 (*)     RBC 4.90      Hgb 15.1      Hct 44.7      MCV 91.2      MCH 30.8      MCHC 33.8      RDW 13.3      Platelet 280      MPV 9.4      Neut % 45.2      Lymph % 31.9      Mono % 11.3      Eos % 10.2      Basophil % 1.1      Lymph # 1.19      Neut # 1.69 (*)     Mono # 0.42      Eos # 0.38      Baso # 0.04      IG# 0.01      IG% 0.3      NRBC% 0.0     TROPONIN I - Normal    Troponin-I <0.010     MAGNESIUM - Normal    Magnesium Level 2.00     B-TYPE NATRIURETIC PEPTIDE - Normal    Natriuretic Peptide 69.3     URINALYSIS, REFLEX TO URINE CULTURE - Normal    Color, UA Light-Yellow      Appearance, UA Clear      Specific Gravity, UA 1.013      pH, UA 6.0      Protein, UA Negative      Glucose, UA Normal      Ketones, UA Negative      Blood, UA Negative      Bilirubin, UA Negative      Urobilinogen, UA Normal      Nitrites, UA Negative      Leukocyte Esterase, UA Negative      RBC, UA 0-5      WBC, UA 0-5      Bacteria, UA None Seen      Squamous Epithelial Cells, UA None Seen     CBC W/ AUTO DIFFERENTIAL    Narrative:     The following orders were created for panel order CBC auto differential.  Procedure                               Abnormality         Status                     ---------                                -----------         ------                     CBC with Differential[[id number]]       Abnormal            Final result                 Please view results for these tests on the individual orders.        ECG Results              EKG 12-lead (Final result)        Collection Time Result Time QRS Duration OHS QTC Calculation    11/04/24 08:17:31 11/05/24 19:31:42 92 418                     Final result by Interface, Lab In [facility] (11/05/24 19:31:52)                   Narrative:    Test Reason : R60.9,    Vent. Rate : 057 BPM     Atrial Rate : 057 BPM     P-R Int : 134 ms          QRS Dur : 092 ms      QT Int : 430 ms       P-R-T Axes : 083 072 072 degrees     QTc Int : 418 ms    Sinus bradycardia  Otherwise normal ECG  When compared with ECG of 04-APR-2024 12:07,  No significant change was found  Confirmed by Timbo Mathews MD ([id number]) on 11/5/2024 7:31:40 PM    Referred By:             Confirmed By:Timbo Mathews MD                                     EKG 12-lead (Final result)  Result time 11/13/24 12:14:43      Final result by Unknown User (11/13/24 12:14:43)                                      Imaging Results              CT Abdomen Pelvis With IV Contrast NO Oral Contrast (Final result)  Result time 11/04/24 10:55:11      Final result by Cecilio Cobb MD (11/04/24 10:55:11)                   Impression:      No acute abnormality identified within the abdomen and pelvis.  The appendix is unremarkable.      Electronically signed by: Cecilio Cobb  Date:    11/04/2024  Time:    10:55               Narrative:    EXAMINATION:  CT ABDOMEN PELVIS WITH IV CONTRAST    CLINICAL HISTORY:  RLQ abdominal pain (Age >= 14y);Right flank pain, dysuria;    TECHNIQUE:  Multidetector IV contrast enhanced axial CT images of the abdomen and pelvis were obtained with coronal and sagittal reconstructions.    Automatic exposure control was utilized to reduce the patient's radiation dose.    DLP=  470    COMPARISON:  04/05/2024    FINDINGS:  01. HEPATOBILIARY: No focal hepatic lesion is identified, The gallbladder is normal.    02. SPLEEN: Normal    03. PANCREAS: No focal masses or ductal dilatation.    04. ADRENALS: No adrenal nodules.    05. KIDNEYS: The right kidney demonstrates no stone, hydronephrosis, or hydroureter. No focal mass identified. The left kidney demonstrates no stone, hydronephrosis, or hydroureter. No focal mass identified.    06. LYMPHADENOPATHY/RETROPERITONEUM: There is no retroperitoneal lymphadenopathy. The abdominal aorta is normal in course and caliber. There are diffuse scattered mural atheromatous calcifications in the aortoiliac system.  Bilateral common femoral veins stents are noted.    07. BOWEL: No acute bowel related abnormalities. No evidence of appendiceal inflammation.    08. PELVIC VISCERA: Normal. No pelvic mass.    09. PELVIC LYMPH NODES: No lymphadenopathy.    10. PERITONEUM/ABDOMINAL WALL: No ascites or implant.    11. SKELETAL: No aggressive appearing lytic/blastic lesion. No acute fractures, subluxations or dislocations.    12. LUNG BASES: The visualized lungs are unremarkable.                                       X-Ray Chest AP Portable (Final result)  Result time 11/04/24 08:23:17      Final result by Cecilio Cobb MD (11/04/24 08:23:17)                   Impression:      No acute cardiopulmonary process.      Electronically signed by: Cecilio Cobb  Date:    11/04/2024  Time:    08:23               Narrative:    EXAMINATION:  XR CHEST AP PORTABLE    CLINICAL HISTORY:  Edema, unspecified    TECHNIQUE:  Single view of the chest    COMPARISON:  03/14/2024    FINDINGS:  No focal opacification, pleural effusion, or pneumothorax.    The cardiomediastinal silhouette is within normal limits.    No acute osseous abnormality.                                       Medications   iohexoL (OMNIPAQUE 350) injection 100 mL (100 mLs Intravenous Given 11/4/24 1044)    acetaminophen tablet 1,000 mg (1,000 mg Oral Given 11/4/24 1122)   methocarbamoL injection 1,000 mg (1,000 mg Intravenous Given 11/4/24 1122)     Medical Decision Making  Problems Addressed:  Chronic deep vein thrombosis (DVT) of distal vein of right lower extremity: acute illness or injury  Flank pain: acute illness or injury  Leg swelling: acute illness or injury  Swelling: acute illness or injury    Amount and/or Complexity of Data Reviewed  Labs: ordered.  Radiology: ordered. Decision-making details documented in ED Course.    Risk  OTC drugs.  Prescription drug management.    Differential diagnosis (includes but is not limited to):   Kidney injury, dehydration, volume overload, CHF, COPD, ACS, arrhythmia, VTE, electrolyte abnormalities    MDM Narrative  64-year-old male presents for multiple complaints, mostly complaining of some right-sided flank pain with some dysuria as well as generalized itching.  He also reports some ongoing chronic lower leg swelling.  Patient does have a history of a chronic DVT, he was currently on anticoagulation.  Labs reviewed, overall workup reassuring.  Ultrasound confirms chronic DVT.  Troponin and BNP unremarkable.  EKG and chest x-ray reviewed.  Given the patient's ongoing tenderness to his flank, CT of the abdomen and pelvis performed and is negative for acute pathology.  Medications given and the patient reports his symptoms have resolved.  He states he feels well and is ready for discharge home.  Strict return precautions discussed and the patient has verbalized understanding, his spouse at bedside has verbalized understanding as well.  Prescriptions for symptom control provided.  Need for follow up with his PCP for close follow-up and further monitoring of his symptoms discussed and the patient has verbalized understanding.    Dispo: Discharge    My independent radiology interpretation: as above  Point of care US (independently performed and interpreted):   Decision  rules/clinical scoring:     Sepsis Perfusion Assessment:     Amount and/or Complexity of Data Reviewed  Independent historian: none   Summary of history:   External data reviewed: notes from previous ED visits and notes from clinic visits  Summary of data reviewed: Prior records reviewed  Risk and benefits of testing: discussed   Labs: ordered and reviewed  Radiology: ordered and independent interpretation performed (see above or ED course)  ECG/medicine tests: ordered and independent interpretation performed (see above or ED course)  Discussion of management or test interpretation with external provider(s): none   Summary of discussion:     Risk  OTC medications  Prescription drug management   Shared decision making     Critical Care  none    Data Reviewed/Counseling: I have personally reviewed the patient's vital signs, nursing notes, and other relevant tests, information, and imaging. I had a detailed discussion regarding the historical points, exam findings, and any diagnostic results supporting the discharge diagnosis. I personally performed the history, PE, MDM and procedures as documented above and agree with the scribe's documentation.    Portions of this note were dictated using voice recognition software. Although it was reviewed for accuracy, some inherent voice recognition errors may have occurred and may be present in this document.          Scribe Attestation:   Scribe #1: I performed the above scribed service and the documentation accurately describes the services I performed. I attest to the accuracy of the note.    Attending Attestation:           Physician Attestation for Scribe:  Physician Attestation Statement for Scribe #1: I, Ronald Waterman MD, reviewed documentation, as scribed by Walker Shook in my presence, and it is both accurate and complete.             ED Course as of 11/20/24 0921   Mon Nov 04, 2024   0820 EKG independently interpreted by me.  EKG: SB @ 57, no STEMI, Qtc 418 [MC]    0807 X-Ray Chest AP Portable  Independently visualized/reviewed by me during the ED visit.  - No acute lobar consolidation or PTX [MC]      ED Course User Index  [MC] Ronald Waterman MD                           Clinical Impression:  Final diagnoses:  [R60.9] Swelling  [M79.89] Leg swelling - chronic  [R10.9] Flank pain (Primary)  [I82.5Z1] Chronic deep vein thrombosis (DVT) of distal vein of right lower extremity          ED Disposition Condition    Discharge Stable          ED Prescriptions       Medication Sig Dispense Start Date End Date Auth. Provider    phenazopyridine (PYRIDIUM) 100 MG tablet () Take 1 tablet (100 mg total) by mouth 3 (three) times daily as needed for Pain. 20 tablet 2024 Ronald Waterman MD    methocarbamoL (ROBAXIN) 500 MG Tab () Take 2 tablets (1,000 mg total) by mouth 3 (three) times daily. for 5 days 30 tablet 2024 Ronald Waterman MD          Follow-up Information       Follow up With Specialties Details Why Contact Info    Mildred Silverio MD Family Medicine Schedule an appointment as soon as possible for a visit   [address]  [facility] [place] [facility] [address]  [phone]      [facility] - Emergency Dept Emergency Medicine  If symptoms worsen [facility] [facility] [id number] [phone]             Ronald Waterman MD  24 0938

## 2024-11-04 NOTE — DISCHARGE INSTRUCTIONS
It is important that you continue to take your anticoagulant medication, Xarelto, as prescribed.    Please follow up with your primary care physician in 2-3 days.  If you do not have a primary care physician, you may call [phone] to be assigned to a primary care provider.    Your visit in the emergency department is NOT definitive care - please follow-up with your primary care doctor and/or specialist within 1-2 days.  Please return if you have any worsening in your condition or if you have any other concerns.    If you had radiology exams like an XRAY or CT in the emergency Department the interpretation on them may be preliminary - there may be less time sensitive findings on the reports. Please obtain these reports within 24 hours from the hospital or by using the jenna for the portal on your mobile phone to access records.  Bring these to your primary care doctor and/or specialist for further review of incidental findings.    Please review any LAB WORK from your visit today with your primary care physician.    Please return to the emergency department if you develop any worsening symptoms, fever, chest pain, difficulty breathing, or any other new symptoms or concerns.      Thank you for allowing us to take care of you today.

## 2024-11-05 LAB
OHS QRS DURATION: 92 MS
OHS QTC CALCULATION: 418 MS

## 2025-07-03 ENCOUNTER — TELEPHONE (OUTPATIENT)
Dept: UROLOGY | Facility: CLINIC | Age: 65
End: 2025-07-03
Payer: MEDICAID

## 2025-07-11 DIAGNOSIS — R97.20 ELEVATED PSA: Primary | ICD-10-CM

## 2025-07-14 ENCOUNTER — OFFICE VISIT (OUTPATIENT)
Dept: UROLOGY | Facility: CLINIC | Age: 65
End: 2025-07-14
Payer: MEDICAID

## 2025-07-14 ENCOUNTER — HOSPITAL ENCOUNTER (OUTPATIENT)
Dept: RADIOLOGY | Facility: CLINIC | Age: 65
Discharge: HOME | End: 2025-07-14
Attending: NURSE PRACTITIONER
Payer: MEDICAID

## 2025-07-14 VITALS
RESPIRATION RATE: 18 BRPM | DIASTOLIC BLOOD PRESSURE: 80 MMHG | BODY MASS INDEX: 29.12 KG/M2 | HEIGHT: 72 IN | SYSTOLIC BLOOD PRESSURE: 118 MMHG | HEART RATE: 76 BPM | OXYGEN SATURATION: 96 % | WEIGHT: 215 LBS

## 2025-07-14 DIAGNOSIS — R35.0 URINARY FREQUENCY: ICD-10-CM

## 2025-07-14 DIAGNOSIS — R10.9 FLANK PAIN: Primary | ICD-10-CM

## 2025-07-14 DIAGNOSIS — R35.1 NOCTURIA: ICD-10-CM

## 2025-07-14 DIAGNOSIS — R31.29 HEMATURIA, MICROSCOPIC: ICD-10-CM

## 2025-07-14 DIAGNOSIS — R10.9 FLANK PAIN: ICD-10-CM

## 2025-07-14 DIAGNOSIS — R97.20 ELEVATED PSA: ICD-10-CM

## 2025-07-14 DIAGNOSIS — R39.15 URINARY URGENCY: ICD-10-CM

## 2025-07-14 LAB
BILIRUBIN, UA POC OHS: NEGATIVE
BLOOD, UA POC OHS: ABNORMAL
CLARITY, UA POC OHS: CLEAR
COLOR, UA POC OHS: YELLOW
GLUCOSE, UA POC OHS: NEGATIVE
KETONES, UA POC OHS: NEGATIVE
LEUKOCYTES, UA POC OHS: NEGATIVE
NITRITE, UA POC OHS: NEGATIVE
PH, UA POC OHS: 6
POC RESIDUAL URINE VOLUME: 19 ML (ref 0–100)
PROTEIN, UA POC OHS: NEGATIVE
PSA, DIAGNOSTIC: 0.51 NG/ML (ref 0–4)
SPECIFIC GRAVITY, UA POC OHS: 1.01
UROBILINOGEN, UA POC OHS: 0.2

## 2025-07-14 PROCEDURE — 1160F RVW MEDS BY RX/DR IN RCRD: CPT | Mod: CPTII,,, | Performed by: NURSE PRACTITIONER

## 2025-07-14 PROCEDURE — 3008F BODY MASS INDEX DOCD: CPT | Mod: CPTII,,, | Performed by: NURSE PRACTITIONER

## 2025-07-14 PROCEDURE — 1159F MED LIST DOCD IN RCRD: CPT | Mod: CPTII,,, | Performed by: NURSE PRACTITIONER

## 2025-07-14 PROCEDURE — 3079F DIAST BP 80-89 MM HG: CPT | Mod: CPTII,,, | Performed by: NURSE PRACTITIONER

## 2025-07-14 PROCEDURE — 3074F SYST BP LT 130 MM HG: CPT | Mod: CPTII,,, | Performed by: NURSE PRACTITIONER

## 2025-07-14 PROCEDURE — 74018 RADEX ABDOMEN 1 VIEW: CPT | Mod: 26,,, | Performed by: RADIOLOGY

## 2025-07-14 PROCEDURE — 4010F ACE/ARB THERAPY RXD/TAKEN: CPT | Mod: CPTII,,, | Performed by: NURSE PRACTITIONER

## 2025-07-14 PROCEDURE — 99204 OFFICE O/P NEW MOD 45 MIN: CPT | Mod: S$PBB,,, | Performed by: NURSE PRACTITIONER

## 2025-07-14 RX ORDER — TAMSULOSIN HYDROCHLORIDE 0.4 MG/1
0.4 CAPSULE ORAL DAILY
Qty: 30 CAPSULE | Refills: 11 | Status: SHIPPED | OUTPATIENT
Start: 2025-07-14 | End: 2026-07-14

## 2025-07-14 NOTE — PROGRESS NOTES
SN used aseptic technique when collecting PSA lab from right forearm using 23g butterfly, no bleeding and no complications noted. Patient tolerated procedures well without complications noted.

## 2025-07-14 NOTE — PROGRESS NOTES
Subjective:       Patient ID: Marko Heard is a 65 y.o. male.    Chief Complaint: Flank Pain (Started a few months ago with burning and aching.) and Testicle Pain (Both testicles)      HPI: 65-year-old male, new to [facility], presents for UR follow-up.    Patient is going to the emergency room on 07/01/2025 due to right flank pain radiating down to the stomach/abdomen and scrotum.    Patient had a scrotal/testicular ultrasound which showed microlithiasis and a CT of the abdomen pelvis without contrast showing no abnormalities.    Patient is currently complaining of frequency, urgency, and nocturia.  States he gets up 6-7 times per night.    At this time he denies any burning with urination.  Denies any odor to the urine denies any fever or body aches.  Denies seeing any blood in his urine.  Denies significant use of tea, soda, or coffee.  He states he stops fluids about 2-3 hours before bed.    No other urinary complaints at this time.         Past Medical History:   Past Medical History:   Diagnosis Date    Anxiety disorder, unspecified     Cross esophagus     COPD (chronic obstructive pulmonary disease)     DVT (deep venous thrombosis)     Right femoral vein    Frequent urination     GERD (gastroesophageal reflux disease)     High cholesterol     Hypertension     PVD (peripheral vascular disease)     Seasonal allergies        Past Surgical Historical:   Past Surgical History:   Procedure Laterality Date    EGD, WITH CLOSED BIOPSY N/A 05/25/2023    Procedure: EGD, WITH CLOSED BIOPSY;  Surgeon: Michele Willson III, MD;  Location: [facility];  Service: Endoscopy;  Laterality: N/A;  A.) Bx of antrum for h-pylori  B.) Bx of antrum pathology analysis  C.) Bx of angularis    ESOPHAGOGASTRODUODENOSCOPY N/A 05/25/2023    Procedure: EGD (ESOPHAGOGASTRODUODENOSCOPY);  Surgeon: Michele Willson III, MD;  Location: [facility];  Service: Endoscopy;  Laterality: N/A;  Moderate Gastritis    INTRAVASCULAR ULTRASOUND,  NON-CORONARY  10/11/2022    Procedure: Intravascular Ultrasound, Non-Coronary;  Surgeon: Timbo Clark MD;  Location: [facility] CATH LAB;  Service: Cardiology;;    stent in leg      bilateral lower extremities        Medications:   Medication List with Changes/Refills   New Medications    TAMSULOSIN (FLOMAX) 0.4 MG CAP    Take 1 capsule (0.4 mg total) by mouth once daily.   Current Medications    AMLODIPINE (NORVASC) 5 MG TABLET    Take 5 mg by mouth every evening.    EZETIMIBE (ZETIA) 10 MG TABLET    Take 10 mg by mouth every evening.    GABAPENTIN (NEURONTIN) 300 MG CAPSULE    Take 1 capsule (300 mg total) by mouth 3 (three) times daily. for 14 days    ISOSORBIDE MONONITRATE (IMDUR) 30 MG 24 HR TABLET    Take 30 mg by mouth once daily.    LINZESS 145 MCG CAP CAPSULE    Take 145 mcg by mouth daily as needed.    LISINOPRIL (PRINIVIL,ZESTRIL) 40 MG TABLET    Take 40 mg by mouth every morning.    METOPROLOL SUCCINATE (TOPROL-XL) 50 MG 24 HR TABLET    Take 50 mg by mouth 2 (two) times daily.    PANTOPRAZOLE (PROTONIX) 40 MG TABLET    Take 40 mg by mouth every morning.    PSYLLIUM HUSK, WITH SUGAR, (METAMUCIL, WITH SUGAR,) 3.4 GRAM PACKET    Take 1 packet by mouth once daily.    TRAZODONE (DESYREL) 50 MG TABLET    Take 50 mg by mouth every evening.    XARELTO 20 MG TAB    Take 1 tablet (20 mg total) by mouth every morning. Stopped 5/20/23   Discontinued Medications    TAMSULOSIN (FLOMAX) 0.4 MG CAP    Take 1 capsule (0.4 mg total) by mouth once daily. At night for bladder        Past Social History: Social History[1]    Allergies: Review of patient's allergies indicates:  No Known Allergies     Family History:   Family History   Problem Relation Name Age of Onset    Hypertension Mother      Diabetes Mother      Diabetes Father      Hypertension Father          Review of Systems:  Review of Systems   Constitutional:  Negative for activity change and appetite change.   HENT:  Negative for congestion and dental problem.    Eyes:   Negative for visual disturbance.   Respiratory:  Negative for chest tightness and shortness of breath.    Cardiovascular:  Negative for chest pain.   Gastrointestinal:  Negative for abdominal distention and abdominal pain.   Genitourinary:  Positive for frequency and urgency. Negative for decreased urine volume, difficulty urinating, dysuria, enuresis, flank pain, genital sores, hematuria, penile discharge, penile pain, penile swelling, scrotal swelling and testicular pain.   Musculoskeletal:  Negative for back pain and neck pain.   Skin:  Negative for color change.   Neurological:  Negative for dizziness.   Hematological:  Negative for adenopathy.   Psychiatric/Behavioral:  Negative for agitation, behavioral problems and confusion.        Physical Exam:  Physical Exam  Vitals and nursing note reviewed.   Constitutional:       Appearance: He is well-developed.   HENT:      Head: Normocephalic.   Eyes:      Pupils: Pupils are equal, round, and reactive to light.   Cardiovascular:      Rate and Rhythm: Normal rate and regular rhythm.      Heart sounds: Normal heart sounds.   Pulmonary:      Effort: Pulmonary effort is normal.      Breath sounds: Normal breath sounds.   Abdominal:      General: Bowel sounds are normal.      Palpations: Abdomen is soft.   Musculoskeletal:         General: Normal range of motion.      Cervical back: Normal range of motion and neck supple.   Skin:     General: Skin is warm and dry.   Neurological:      Mental Status: He is alert and oriented to person, place, and time.   Psychiatric:         Behavior: Behavior normal.       Urinalysis: Trace intact blood, red blood cells 0-2.    Bladder scan: 19 cc    Assessment/Plan:   1. Frequency/urgency/nocturia:  Patient's bladder scan is pretty stable at 19 cc.    Denies significant use of tea, soda, or coffee.  He also limits fluid intake before bed.    Will start patient on Flomax 0.4 mg daily.    2. Elevated PSA:  No PSA results included with  referral.    Will check PSA today.  We will notify patient results.    Patient declines FÁTIMA.  States he had a FÁTMIA 8 months ago with his primary care provider.      3. Microscopic hematuria: Urinalysis shows trace intact blood red blood cells 0-2.    Former smoker.  Quit 19 years ago but smoked for about 24 years averaging a pack per day.  Will repeat UA at next visit.      Will plan follow-up in 6 weeks, sooner if needed.    Problem List Items Addressed This Visit    None  Visit Diagnoses         Flank pain    -  Primary    Relevant Orders    POCT Bladder Scan (Completed)    POCT Urinalysis(Instrument) (Completed)    X-Ray Abdomen AP 1 View      Elevated PSA          Urinary frequency        Relevant Medications    tamsulosin (FLOMAX) 0.4 mg Cap    Other Relevant Orders    Prostate Specific Antigen, Diagnostic      Urinary urgency        Relevant Medications    tamsulosin (FLOMAX) 0.4 mg Cap    Other Relevant Orders    Prostate Specific Antigen, Diagnostic      Nocturia        Relevant Medications    tamsulosin (FLOMAX) 0.4 mg Cap    Other Relevant Orders    Prostate Specific Antigen, Diagnostic      Hematuria, microscopic                          [1]   Social History  Socioeconomic History    Marital status:    Tobacco Use    Smoking status: Former     Current packs/day: 0.00     Types: Cigarettes     Quit date:      Years since quittin.5    Smokeless tobacco: Never   Substance and Sexual Activity    Alcohol use: Yes     Comment: Occasionally    Drug use: Never    Sexual activity: Not Currently

## 2025-07-15 ENCOUNTER — TELEPHONE (OUTPATIENT)
Dept: UROLOGY | Facility: CLINIC | Age: 65
End: 2025-07-15
Payer: MEDICAID

## 2025-07-15 NOTE — TELEPHONE ENCOUNTER
Patient was called and no answer. Message was left for a return call to go over PSA results.       ----- Message from Barrington Leon NP sent at 7/15/2025  8:31 AM CDT -----  PSA is in the normal range at 0.507.  ----- Message -----  From: Caprice Vang  Sent: 7/14/2025  10:30 AM CDT  To: Barrington Leon NP

## 2025-07-15 NOTE — TELEPHONE ENCOUNTER
Patient was called and informed of PSA results as stated below. Patient verbalized understanding.     ----- Message from Barrington Leon NP sent at 7/15/2025  8:31 AM CDT -----  PSA is in the normal range at 0.507.  ----- Message -----  From: Caprice Vang  Sent: 7/14/2025  10:30 AM CDT  To: Barrington Leon NP

## 2025-08-22 ENCOUNTER — TELEPHONE (OUTPATIENT)
Dept: UROLOGY | Facility: CLINIC | Age: 65
End: 2025-08-22
Payer: MEDICAID

## (undated) DEVICE — SHEATH PINNACLE 8FR

## (undated) DEVICE — KIT MINI STICK MAX 5F 21GX7CM

## (undated) DEVICE — CANNULA NASAL ADULT

## (undated) DEVICE — SET ANGIO ACIST CVI ANGIOTOUCH

## (undated) DEVICE — KIT SURGICAL COLON .25 1.1OZ

## (undated) DEVICE — Device

## (undated) DEVICE — BITE BLOCK ADULT LATEX FREE

## (undated) DEVICE — FORCEP CAPTURA PRO SPK 230CM

## (undated) DEVICE — SHEATH HEMOSTASIS 10FR 12CM

## (undated) DEVICE — CATH VISIONS PV IVUS .035

## (undated) DEVICE — BLLN ATLAS GOLD 16 X 60

## (undated) DEVICE — GUIDEWIRE EMERALD .035IN 260CM